# Patient Record
Sex: MALE | Race: WHITE | Employment: OTHER | URBAN - METROPOLITAN AREA
[De-identification: names, ages, dates, MRNs, and addresses within clinical notes are randomized per-mention and may not be internally consistent; named-entity substitution may affect disease eponyms.]

---

## 2019-02-15 ENCOUNTER — OFFICE VISIT (OUTPATIENT)
Dept: ORTHOPEDIC SURGERY | Age: 79
End: 2019-02-15
Payer: MEDICARE

## 2019-02-15 VITALS — HEIGHT: 72 IN | WEIGHT: 220.9 LBS | BODY MASS INDEX: 29.92 KG/M2

## 2019-02-15 DIAGNOSIS — M25.511 RIGHT SHOULDER PAIN, UNSPECIFIED CHRONICITY: Primary | ICD-10-CM

## 2019-02-15 DIAGNOSIS — S46.011A STRAIN OF RIGHT ROTATOR CUFF CAPSULE, INITIAL ENCOUNTER: ICD-10-CM

## 2019-02-15 DIAGNOSIS — M54.2 CERVICAL SPINE PAIN: ICD-10-CM

## 2019-02-15 DIAGNOSIS — M54.12 CERVICAL RADICULITIS: ICD-10-CM

## 2019-02-15 PROCEDURE — 99203 OFFICE O/P NEW LOW 30 MIN: CPT | Performed by: PHYSICIAN ASSISTANT

## 2019-02-15 RX ORDER — METHYLPREDNISOLONE 4 MG/1
TABLET ORAL
Qty: 1 KIT | Refills: 0 | Status: ON HOLD | OUTPATIENT
Start: 2019-02-15 | End: 2022-10-22

## 2019-02-15 RX ORDER — TRAMADOL HYDROCHLORIDE 50 MG/1
50 TABLET ORAL EVERY 8 HOURS PRN
Qty: 15 TABLET | Refills: 0 | Status: SHIPPED | OUTPATIENT
Start: 2019-02-15 | End: 2019-02-20

## 2019-02-20 ENCOUNTER — OFFICE VISIT (OUTPATIENT)
Dept: ORTHOPEDIC SURGERY | Age: 79
End: 2019-02-20
Payer: MEDICARE

## 2019-02-20 VITALS — WEIGHT: 220 LBS | HEIGHT: 72 IN | BODY MASS INDEX: 29.8 KG/M2

## 2019-02-20 DIAGNOSIS — S46.011A STRAIN OF RIGHT ROTATOR CUFF CAPSULE, INITIAL ENCOUNTER: ICD-10-CM

## 2019-02-20 DIAGNOSIS — M25.511 RIGHT SHOULDER PAIN, UNSPECIFIED CHRONICITY: Primary | ICD-10-CM

## 2019-02-20 PROCEDURE — 99213 OFFICE O/P EST LOW 20 MIN: CPT | Performed by: ORTHOPAEDIC SURGERY

## 2019-02-27 ENCOUNTER — OFFICE VISIT (OUTPATIENT)
Dept: ORTHOPEDIC SURGERY | Age: 79
End: 2019-02-27
Payer: MEDICARE

## 2019-02-27 VITALS — WEIGHT: 220 LBS | HEIGHT: 72 IN | BODY MASS INDEX: 29.8 KG/M2

## 2019-02-27 DIAGNOSIS — M25.511 RIGHT SHOULDER PAIN, UNSPECIFIED CHRONICITY: Primary | ICD-10-CM

## 2019-02-27 DIAGNOSIS — S40.011A CONTUSION OF RIGHT SHOULDER, INITIAL ENCOUNTER: ICD-10-CM

## 2019-02-27 PROCEDURE — 99213 OFFICE O/P EST LOW 20 MIN: CPT | Performed by: ORTHOPAEDIC SURGERY

## 2019-03-06 ENCOUNTER — HOSPITAL ENCOUNTER (OUTPATIENT)
Dept: PHYSICAL THERAPY | Age: 79
Setting detail: THERAPIES SERIES
Discharge: HOME | End: 2019-03-06
Payer: MEDICARE

## 2019-03-06 ENCOUNTER — OFFICE VISIT (OUTPATIENT)
Dept: ORTHOPEDIC SURGERY | Age: 79
End: 2019-03-06
Payer: MEDICARE

## 2019-03-06 VITALS — HEIGHT: 72 IN | WEIGHT: 220.02 LBS | BODY MASS INDEX: 29.8 KG/M2

## 2019-03-06 DIAGNOSIS — S40.011A CONTUSION OF RIGHT SHOULDER, INITIAL ENCOUNTER: ICD-10-CM

## 2019-03-06 DIAGNOSIS — M17.10 ARTHRITIS OF KNEE: Primary | ICD-10-CM

## 2019-03-06 PROCEDURE — 99213 OFFICE O/P EST LOW 20 MIN: CPT | Performed by: ORTHOPAEDIC SURGERY

## 2019-03-06 PROCEDURE — 97112 NEUROMUSCULAR REEDUCATION: CPT | Performed by: PHYSICAL THERAPIST

## 2019-03-06 PROCEDURE — 97162 PT EVAL MOD COMPLEX 30 MIN: CPT | Performed by: PHYSICAL THERAPIST

## 2019-03-06 PROCEDURE — 97110 THERAPEUTIC EXERCISES: CPT | Performed by: PHYSICAL THERAPIST

## 2019-03-06 PROCEDURE — 97140 MANUAL THERAPY 1/> REGIONS: CPT | Performed by: PHYSICAL THERAPIST

## 2019-03-06 PROCEDURE — G8984 CARRY CURRENT STATUS: HCPCS | Performed by: PHYSICAL THERAPIST

## 2019-03-06 PROCEDURE — G8985 CARRY GOAL STATUS: HCPCS | Performed by: PHYSICAL THERAPIST

## 2019-03-11 ENCOUNTER — HOSPITAL ENCOUNTER (OUTPATIENT)
Dept: PHYSICAL THERAPY | Age: 79
Setting detail: THERAPIES SERIES
Discharge: HOME | End: 2019-03-11
Payer: MEDICARE

## 2019-03-11 PROCEDURE — 97140 MANUAL THERAPY 1/> REGIONS: CPT

## 2019-03-11 PROCEDURE — 97112 NEUROMUSCULAR REEDUCATION: CPT

## 2019-03-11 PROCEDURE — G0283 ELEC STIM OTHER THAN WOUND: HCPCS

## 2019-03-11 PROCEDURE — 97110 THERAPEUTIC EXERCISES: CPT

## 2019-03-13 ENCOUNTER — HOSPITAL ENCOUNTER (OUTPATIENT)
Dept: PHYSICAL THERAPY | Age: 79
Setting detail: THERAPIES SERIES
Discharge: HOME | End: 2019-03-13
Payer: MEDICARE

## 2019-03-13 PROCEDURE — G0283 ELEC STIM OTHER THAN WOUND: HCPCS

## 2019-03-13 PROCEDURE — 97110 THERAPEUTIC EXERCISES: CPT

## 2019-03-13 PROCEDURE — 97140 MANUAL THERAPY 1/> REGIONS: CPT

## 2019-03-18 ENCOUNTER — HOSPITAL ENCOUNTER (OUTPATIENT)
Dept: PHYSICAL THERAPY | Age: 79
Setting detail: THERAPIES SERIES
Discharge: HOME | End: 2019-03-18
Payer: MEDICARE

## 2019-03-18 PROCEDURE — 97140 MANUAL THERAPY 1/> REGIONS: CPT | Performed by: PHYSICAL THERAPIST

## 2019-03-18 PROCEDURE — G0283 ELEC STIM OTHER THAN WOUND: HCPCS | Performed by: PHYSICAL THERAPIST

## 2019-03-18 PROCEDURE — 97110 THERAPEUTIC EXERCISES: CPT | Performed by: PHYSICAL THERAPIST

## 2019-03-20 ENCOUNTER — HOSPITAL ENCOUNTER (OUTPATIENT)
Dept: PHYSICAL THERAPY | Age: 79
Setting detail: THERAPIES SERIES
Discharge: HOME | End: 2019-03-20
Payer: MEDICARE

## 2019-03-20 PROCEDURE — 97110 THERAPEUTIC EXERCISES: CPT

## 2019-03-20 PROCEDURE — 97112 NEUROMUSCULAR REEDUCATION: CPT

## 2019-03-25 ENCOUNTER — HOSPITAL ENCOUNTER (OUTPATIENT)
Dept: PHYSICAL THERAPY | Age: 79
Setting detail: THERAPIES SERIES
Discharge: HOME | End: 2019-03-25
Payer: MEDICARE

## 2019-03-25 PROCEDURE — 97112 NEUROMUSCULAR REEDUCATION: CPT

## 2019-03-27 ENCOUNTER — OFFICE VISIT (OUTPATIENT)
Dept: ORTHOPEDIC SURGERY | Age: 79
End: 2019-03-27
Payer: MEDICARE

## 2019-03-27 ENCOUNTER — HOSPITAL ENCOUNTER (OUTPATIENT)
Dept: PHYSICAL THERAPY | Age: 79
Setting detail: THERAPIES SERIES
Discharge: HOME | End: 2019-03-27
Payer: MEDICARE

## 2019-03-27 ENCOUNTER — APPOINTMENT (OUTPATIENT)
Dept: PHYSICAL THERAPY | Age: 79
End: 2019-03-27
Payer: MEDICARE

## 2019-03-27 VITALS — HEIGHT: 72 IN | BODY MASS INDEX: 29.8 KG/M2 | WEIGHT: 220.02 LBS

## 2019-03-27 DIAGNOSIS — S40.011A CONTUSION OF RIGHT SHOULDER, INITIAL ENCOUNTER: Primary | ICD-10-CM

## 2019-03-27 DIAGNOSIS — M17.10 ARTHRITIS OF KNEE: ICD-10-CM

## 2019-03-27 PROCEDURE — 97140 MANUAL THERAPY 1/> REGIONS: CPT | Performed by: PHYSICAL THERAPIST

## 2019-03-27 PROCEDURE — 97110 THERAPEUTIC EXERCISES: CPT | Performed by: PHYSICAL THERAPIST

## 2019-03-27 PROCEDURE — 99213 OFFICE O/P EST LOW 20 MIN: CPT | Performed by: ORTHOPAEDIC SURGERY

## 2019-03-27 PROCEDURE — 97112 NEUROMUSCULAR REEDUCATION: CPT | Performed by: PHYSICAL THERAPIST

## 2019-04-03 ENCOUNTER — HOSPITAL ENCOUNTER (OUTPATIENT)
Dept: PHYSICAL THERAPY | Age: 79
Setting detail: THERAPIES SERIES
Discharge: HOME | End: 2019-04-03
Payer: MEDICARE

## 2019-04-03 PROCEDURE — 97112 NEUROMUSCULAR REEDUCATION: CPT

## 2019-04-03 PROCEDURE — 97140 MANUAL THERAPY 1/> REGIONS: CPT

## 2019-04-03 PROCEDURE — 97110 THERAPEUTIC EXERCISES: CPT

## 2019-04-03 NOTE — FLOWSHEET NOTE
[facility],  [address] [place]  Phone: [phone]  Fax [phone]      Physical Therapy Daily Treatment Note  Date:  4/3/2019    Patient Name:  Sharron Pineda  \"Ranulfo\"  :  1940  MRN: [id number]  Restrictions/Precautions:    Medical/Treatment Diagnosis Information:  · Diagnosis: M25.511 (ICD-10-CM) - Right shoulder pain, unspecified chronicity, S40.011A (ICD-10-CM) - Contusion of right shoulder, initial encounter  · Treatment Diagnosis: M25.511 (ICD-10-CM) - Right shoulder pain, unspecified chronicity  Insurance/Certification information:  PT Insurance Information:  Ellie Allred   - 150D-96/4-$0CP-PT/OT MED NEC  Physician Information:  Referring Practitioner: Verito Walton of care signed (Y/N):     Date of Patient follow up with Physician: 3/27/19    G-Code (if applicable):      Date G-Code Applied:  3/6  PT G-Codes  Functional Assessment Tool Used: QDASH  Score: 61%  Functional Limitation: Carrying, moving and handling objects  Carrying, Moving and Handling Objects Current Status (): At least 60 percent but less than 80 percent impaired, limited or restricted  Carrying, Moving and Handling Objects Goal Status (): At least 20 percent but less than 40 percent impaired, limited or restricted    Progress Note: []  Yes  [x]  No  Next due by: Visit #10      Latex Allergy:  [x]NO      []YES  Preferred Language for Healthcare:   [x]English       []other:    Visit # Insurance Allowable Requires auth   8 BMN    [x]no        []yes:     Pain level:  0-6/10     SUBJECTIVE: Patient reports that his shoulder hurt him all morning. He reports that his wife continues to put the TENS Unit and Ice on his shoulder.      OBJECTIVE: Elevated RW to influence a more upright posture    Observation: Sit to stand with less difficulty and no report of pain    R UT compensation during scap squeezes, Poor postural awareness, very forward head  Test measurements:      RESTRICTIONS/PRECAUTIONS:  Dementia,DM, neuropathy, R TKA, hx of CA, HTN, FALL RISK      Exercises/Interventions: Demo HEP with some corrections for technique  Therapeutic Ex Sets/rep comments    seated chin tucks  Difficult initiating   ER/IR isos  HEP   Scap squeezes  HEP   Long arc quad  Marching  HEP  HEP   AAROM Abd ring FF and Rows 2 x 10 eachseated   Cane ER S    Candace line posture exercise    Active elevation reach behind R ear    Active shld flexion w short lever arm    AAROM knee flexion S in sitting     TB row seated     Standing arm flexion S on wedge Posterior lean   R UE reaching forward, diagonals to cones standing Cues for knee ext   No money seated RTB 1x10    SL ER  SL flexion     Supine punches  Supine D1 flexion Pt ed/wife: POC, HEP, use of walker, posture, safe transfer technique, heat v. Ice, handrails, carrying a phone/getting basket for walker, type of walker that will be best                    Manual Intervention     PROM shoulder in sitting ER and FF, gentle oscillation, gentle STM to UT and Bicep, STM to cervical paraspinals 15'                                  NMR re-education      Gait training, walker heightadjustment posture with walker 8 min Cues for posture/w rest periods   Double stance, Modified Tandem R/L rearfoot All x 5 reps each w/ rest periods Gait belt and Air-Ex   B Hip flexion on Air -Ex/Abd Stair training B handrails 1 stair at a time   Sit to stand mod assist and 2 hand assist 10x    Double Stance on Air-Ex/scap squeeze 2 x 7 CGA   Balance w gait Tandem Amb x 3 laps and HF w/ Heel strike x 3 laps, Backward amb 1/2 lap 8 min W/ Gait belt CGA, posterior hips, difficulty maneuvering walker backwards   Sit to stand B abd OVL/scap squeeze 1x 7 reps CGA, no air-ex       Therapeutic Exercise and NMR EXR  [x] (99787) Provided verbal/tactile cueing for activities related to strengthening, flexibility, endurance, ROM  for improvements in scapular, scapulothoracic and UE control with self care, reaching, carrying, lifting, house/yardwork, driving/computer work. [x] (84470) Provided verbal/tactile cueing for activities related to improving balance, coordination, kinesthetic sense, posture, motor skill, proprioception  to assist with  scapular, scapulothoracic and UE control with self care, reaching, carrying, lifting, house/yardwork, driving/computer work. Therapeutic Activities:    [] (88503 or 88089) Provided verbal/tactile cueing for activities related to improving balance, coordination, kinesthetic sense, posture, motor skill, proprioception and motor activation to allow for proper function of scapular, scapulothoracic and UE control with self care, carrying, lifting, driving/computer work.      Home Exercise Program:    [x] (94179) Reviewed/Progressed HEP activities related to strengthening, flexibility, endurance, ROM of scapular, scapulothoracic and UE control with self care, reaching, carrying, lifting, house/yardwork, driving/computer work  [] (13121) Reviewed/Progressed HEP activities related to improving balance, coordination, kinesthetic sense, posture, motor skill, proprioception of scapular, scapulothoracic and UE control with self care, reaching, carrying, lifting, house/yardwork, driving/computer work      Manual Treatments:  PROM / STM / Oscillations-Mobs:  G-I, II, III, IV (PA's, Inf., Post.)  [x] (53429) Provided manual therapy to mobilize soft tissue/joints of cervical/CT, scapular GHJ and UE for the purpose of modulating pain, promoting relaxation,  increasing ROM, reducing/eliminating soft tissue swelling/inflammation/restriction, improving soft tissue extensibility and allowing for proper ROM for normal function with self care, reaching, carrying, lifting, house/yardwork, driving/computer work    Modalities:   Charges:  Timed Code Treatment Minutes: 55   Total Treatment Minutes: 55     [] NEIL (LOW) 159 1011 (typically 20 minutes face-to-face)  [] EVAL (MOD) 86500 (typically 30 minutes face-to-face)  [] EVAL (HIGH) 43516 (typically 45 minutes face-to-face)  [] RE-EVAL     [x] VL(07905) x  1   [] IONTO  [x] NMR (77222) x  2   [] VASO  [x] Manual (71478) x  1    [] Other:  [] TA x       [] Mech Traction (48602)  [] ES(attended) (59255)      [] ES (un) (39320):     GOALS:  Patient stated goal: decrease pain in shoulder     Therapist goals for Patient:   Short Term Goals: To be achieved in: 2 weeks  1. Independent in HEP and progression per patient tolerance, in order to prevent re-injury. 2. Patient will have a decrease in pain to facilitate improvement in movement, function, and ADLs as indicated by Functional Deficits.     Long Term Goals: To be achieved in: 8 weeks  1. Disability index score of 30% or less for the [facility] to assist with reaching prior level of function. 2. Patient will demonstrate increased AROM to = to L side to allow for proper joint functioning as indicated by patients Functional Deficits. 3. Patient will demonstrate an increase in Strength to grossly 4/5 in shoulder and scapula; 5/5 in LEs to allow for proper functional mobility as indicated by patients Functional Deficits. 4. Patient will demonstrate good mechanics and safety awareness with gait, sit to stand, stand to sit, and stairs  without increased symptoms or restriction. 5. Patient will be able to tolerate WB through R UE on RW for 300' or greater without increased symptoms in the shoulder. Progression Towards Functional goals:  [] Patient is progressing as expected towards functional goals listed. [x] Progression is slowed due to complexities listed. [] Progression has been slowed due to co-morbidities. [] Plan just implemented, too soon to assess goals progression  [x] Other: Shoulder and knee pain    ASSESSMENT: Pt requires continuous verbal and tactile cues for upright posture.  Patient did report less shoulder pain after raising the height of his walker. Treatment/Activity Tolerance:  [] Patient tolerated treatment well [x] Patient limited by fatique  [x] Patient limited by pain  [] Patient limited by other medical complications  [] Other:     Prognosis: [] Good [x] Fair  [] Poor    Patient Requires Follow-up: [x] Yes  [] No    PLAN: Cont treatment balance training and safety with amb and transfers, shoulder functional ROM and strength.   [x] Continue per plan of care [] Alter current plan (see comments)  [] Plan of care initiated [] Hold pending MD visit [] Discharge    Electronically signed by: Maryann Guevara PT

## 2019-04-10 ENCOUNTER — HOSPITAL ENCOUNTER (OUTPATIENT)
Dept: PHYSICAL THERAPY | Age: 79
Setting detail: THERAPIES SERIES
Discharge: HOME | End: 2019-04-10
Payer: MEDICARE

## 2019-04-10 PROCEDURE — 97140 MANUAL THERAPY 1/> REGIONS: CPT

## 2019-04-10 PROCEDURE — 97112 NEUROMUSCULAR REEDUCATION: CPT

## 2019-04-10 PROCEDURE — 97110 THERAPEUTIC EXERCISES: CPT

## 2019-04-10 NOTE — FLOWSHEET NOTE
[facility],  [address]  Phone: [phone]  Fax [phone]      Physical Therapy Daily Treatment Note  Date:  4/10/2019    Patient Name:  Hank Ivy  \"Ranulfo\"  :  1940  MRN: [id number]  Restrictions/Precautions:    Medical/Treatment Diagnosis Information:  · Diagnosis: M25.511 (ICD-10-CM) - Right shoulder pain, unspecified chronicity, S40.011A (ICD-10-CM) - Contusion of right shoulder, initial encounter  · Treatment Diagnosis: M25.511 (ICD-10-CM) - Right shoulder pain, unspecified chronicity  Insurance/Certification information:  PT Insurance Information:  [id number] [employer]  - 150D-96/4-$0CP-PT/OT MED NEC  Physician Information:  Referring Practitioner: Ricki Guo of care signed (Y/N):     Date of Patient follow up with Physician: 3/27/19    G-Code (if applicable):      Date G-Code Applied:  3/6  PT G-Codes  Functional Assessment Tool Used: QDASH  Score: 61%  Functional Limitation: Carrying, moving and handling objects  Carrying, Moving and Handling Objects Current Status (): At least 60 percent but less than 80 percent impaired, limited or restricted  Carrying, Moving and Handling Objects Goal Status (): At least 20 percent but less than 40 percent impaired, limited or restricted    Progress Note: []  Yes  [x]  No  Next due by: Visit #10      Latex Allergy:  [x]NO      []YES  Preferred Language for Healthcare:   [x]English       []other:    Visit # Insurance Allowable Requires auth   9 BMN    [x]no        []yes:     Pain level:  0-6/10     SUBJECTIVE: Patient reports that his shoulder has been killing him. More pain the past couple of days than ever before. He reports that he wants nothing to do with his walker.   OBJECTIVE: Elevated RW to influence a more upright posture    Observation: Sit to stand with less difficulty and no report of pain    R UT compensation during scap squeezes, Poor postural awareness, very forward head  Test measurements:      RESTRICTIONS/PRECAUTIONS:  Dementia,DM, neuropathy, R TKA, hx of CA, HTN, FALL RISK      Exercises/Interventions: Demo HEP with some corrections for technique  Therapeutic Ex Sets/rep comments    seated chin tucks  Difficult initiating   ER/IR isos  HEP   Scap squeezes  HEP   Long arc quad  Marching  HEP  HEP   AAROM Abd ring FF and Rows 2 x 10 eachseated   Cane ER S    Candace line posture exercise    Active elevation reach behind R ear    Active shld flexion w short lever arm    AAROM knee flexion S in sitting     TB row seated     Standing arm flexion S on wedge Posterior lean   R UE reaching forward, diagonals to cones standing Cues for knee ext   No money seated RTB 1x10    SL ER  SL flexion     Supine punches  Supine D1 flexion Pt ed/wife: POC, HEP, use of walker, posture, safe transfer technique, heat v. Ice, handrails, carrying a phone/getting basket for walker, type of walker that will be best                    Manual Intervention     PROM shoulder in sitting ER and FF, gentle oscillation, gentle STM to UT and Bicep, STM to cervical paraspinals 15'                                  NMR re-education      Gait training, walker heightadjustment posture with walker 8 min Cues for posture/w rest periods   Double stance, Modified Tandem R/L rearfoot All x 5 reps each w/ rest periods Gait belt and Air-Ex   B Hip flexion on Air -Ex/Abd Stair training B handrails 1 stair at a time   Sit to stand mod assist and 2 hand assist 10x    Double Stance on Air-Ex/scap squeeze 2 x 7 CGA   Balance w gait Tandem Amb x 3 laps and HF w/ Heel strike x 3 laps, Backward amb 1/2 lap 8 min W/ Gait belt CGA, posterior hips, difficulty maneuvering walker backwards   Sit to stand B abd OVL/scap squeeze 1x 7 reps CGA, no air-ex       Therapeutic Exercise and NMR EXR  [x] (39725) Provided verbal/tactile cueing for activities related to strengthening, flexibility, endurance, ROM 04322 (typically 20 minutes face-to-face)  [] EVAL (MOD) 62600 (typically 30 minutes face-to-face)  [] EVAL (HIGH) 24197 (typically 45 minutes face-to-face)  [] RE-EVAL     [x] VF(74405) x  1   [] IONTO  [x] NMR (71191) x  2   [] VASO  [x] Manual (13934) x  1    [] Other:  [] TA x       [] Mech Traction (21789)  [] ES(attended) (20524)      [] ES (un) (85639):     GOALS:  Patient stated goal: decrease pain in shoulder     Therapist goals for Patient:   Short Term Goals: To be achieved in: 2 weeks  1. Independent in HEP and progression per patient tolerance, in order to prevent re-injury. 2. Patient will have a decrease in pain to facilitate improvement in movement, function, and ADLs as indicated by Functional Deficits.     Long Term Goals: To be achieved in: 8 weeks  1. Disability index score of 30% or less for the [facility] to assist with reaching prior level of function. 2. Patient will demonstrate increased AROM to = to L side to allow for proper joint functioning as indicated by patients Functional Deficits. 3. Patient will demonstrate an increase in Strength to grossly 4/5 in shoulder and scapula; 5/5 in LEs to allow for proper functional mobility as indicated by patients Functional Deficits. 4. Patient will demonstrate good mechanics and safety awareness with gait, sit to stand, stand to sit, and stairs  without increased symptoms or restriction. 5. Patient will be able to tolerate WB through R UE on RW for 300' or greater without increased symptoms in the shoulder. Progression Towards Functional goals:  [] Patient is progressing as expected towards functional goals listed. [x] Progression is slowed due to complexities listed. [] Progression has been slowed due to co-morbidities. [] Plan just implemented, too soon to assess goals progression  [x] Other: Shoulder and knee pain    ASSESSMENT: Max cues for upright posture throughout treatment.  Patient expresses frustration with his amount of pain and the length of time that he has had the pain in his shoulder. Treatment/Activity Tolerance:  [] Patient tolerated treatment well [x] Patient limited by fatique  [x] Patient limited by pain  [] Patient limited by other medical complications  [] Other:     Prognosis: [] Good [x] Fair  [] Poor    Patient Requires Follow-up: [x] Yes  [] No    PLAN: Cont treatment balance training and safety with amb and transfers, shoulder functional ROM and strength.   [x] Continue per plan of care [] Alter current plan (see comments)  [] Plan of care initiated [] Hold pending MD visit [] Discharge    Electronically signed by: Amador Sanabria PT

## 2019-04-17 ENCOUNTER — HOSPITAL ENCOUNTER (OUTPATIENT)
Dept: PHYSICAL THERAPY | Age: 79
Setting detail: THERAPIES SERIES
Discharge: HOME | End: 2019-04-17
Payer: MEDICARE

## 2019-04-17 PROCEDURE — G8985 CARRY GOAL STATUS: HCPCS | Performed by: PHYSICAL THERAPIST

## 2019-04-17 PROCEDURE — 97110 THERAPEUTIC EXERCISES: CPT | Performed by: PHYSICAL THERAPIST

## 2019-04-17 PROCEDURE — 97140 MANUAL THERAPY 1/> REGIONS: CPT | Performed by: PHYSICAL THERAPIST

## 2019-04-17 PROCEDURE — 97112 NEUROMUSCULAR REEDUCATION: CPT | Performed by: PHYSICAL THERAPIST

## 2019-04-17 PROCEDURE — G8984 CARRY CURRENT STATUS: HCPCS | Performed by: PHYSICAL THERAPIST

## 2019-04-17 NOTE — PLAN OF CARE
[facility], [address], [address] [place] [address]  Phone: [phone]  Fax [phone]     Physical Therapy Re-Certification Plan of Care    Dear  ,    We had the pleasure of treating the following patient for physical therapy services at [address] [place] [address]. A summary of our findings can be found in the updated assessment below. This includes our plan of care. If you have any questions or concerns regarding these findings, please do not hesitate to contact me at the office phone number checked above. Thank you for the referral.     Physician Signature:________________________________Date:__________________  By signing above, therapists plan is approved by physician      Patient: Subhash Pop   : 1940   MRN: [id number]  Referring Physician:        Evaluation Date: 2019      Medical/Treatment Diagnosis Information:  · Diagnosis: M25.511 (ICD-10-CM) - Right shoulder pain, unspecified chronicity, S40.011A (ICD-10-CM) - Contusion of right shoulder, initial encounter  · Treatment Diagnosis: M25.511 (ICD-10-CM) - Right shoulder pain, unspecified chronicity  Insurance/Certification information:  PT Insurance Information:  [id number] [place]  - 150D-96/4-$0CP-PT/OT MED NEC    Date Range:3/6/19-19  Total visits:10      G-Codes: (if applicable) PT G-Codes  Functional Assessment Tool Used: Qdash  Score: 61%  Functional Limitation: Carrying, moving and handling objects  Carrying, Moving and Handling Objects Current Status (): At least 60 percent but less than 80 percent impaired, limited or restricted  Carrying, Moving and Handling Objects Goal Status ():  At least 20 percent but less than 40 percent impaired, limited or restricted   Functional Index used:    SUBJECTIVE: Patient reports he feels that his shoulder pain has been getting worse over the last week and he is also experiencing discomfort in level   [x]pain/difficulty with lifting/reaching/carrying - Reduced overall functional level  with carrying and lifting   []unable to perform sport/recreational activity due to pain and dysfunction   [x]other:  gym     Prognosis/Rehab Potential:    []Excellent   []Good    [x]Fair   []Poor: Toleration of evaluation or treatment:    []Excellent   []Good    [x]Fair   []Poor     New or Updated Goals (if applicable):  [] No change to goals established upon initial eval/last progress note:  New Goals:    GOALS: Long Term Goals: To be achieved by:  5/31/19  1. Disability index score of 30% or less for the [facility] to assist with reaching prior level of function. --no progress  2. Patient will demonstrate increased AROM to = to L side to allow for proper joint functioning as indicated by patients Functional Deficits. --progressing  3. Patient will demonstrate an increase in Strength to grossly 4/5 in shoulder and scapula; 5/5 in LEs to allow for proper functional mobility as indicated by patients Functional Deficits. Slight progress  4. Patient will demonstrate good mechanics and safety awareness with gait, sit to stand, stand to sit, and stairs  without increased symptoms or restriction. --progressing  5. Patient will be able to tolerate WB through R UE on RW for 300' or greater without increased symptoms in the shoulder. --progressing  6. Pt will complete TUG test in </= 19\" with AD to demonstrate decreased fall risk. Rehab Potential:   []Excellent   [] Good   [x] Fair   [] Poor    Plan of Care:  [x] Continue Current Therapy Intervention    Frequency/Duration:  1-2 days per week for 6 Weeks:  HEP instruction: yes  1. Therapeutic exercise including: strength training, ROM, NMR and proprioception for the scapula, core and Upper extremity  2. Manual therapy as indicated including Dry Needling/IASTM, STM, PROM, Gr I-IV mobilizations, spinal mobilization/manipulation.    3. Modalities as needed including: thermal agents, E-stim, US, iontophoresis as indicated. 4. Patient education on joint protection, activity modification, progression of HEP.        Electronically signed by:  Magalis Dumont PT

## 2019-04-17 NOTE — FLOWSHEET NOTE
[facility],  [address]  Phone: [phone]  Fax [phone]      Physical Therapy Daily Treatment Note  Date:  2019    Patient Name:  Bandar Lamb  \"Ranulfo\"  :  1940  MRN: [id number]  Restrictions/Precautions:    Medical/Treatment Diagnosis Information:  · Diagnosis: M25.511 (ICD-10-CM) - Right shoulder pain, unspecified chronicity, S40.011A (ICD-10-CM) - Contusion of right shoulder, initial encounter  · Treatment Diagnosis: M25.511 (ICD-10-CM) - Right shoulder pain, unspecified chronicity  Insurance/Certification information:  PT Insurance Information:  [id number] [employer]. [place]  - 150D-96/4-$0CP-PT/OT MED NEC  Physician Information:  Referring Practitioner: Shruti Partida of care signed (Y/N):     Date of Patient follow up with Physician: 3/27/19    G-Code (if applicable):   10%    Date G-Code Applied:  3/6  PT G-Codes  Functional Assessment Tool Used: QDASH  Score: 61%  Functional Limitation: Carrying, moving and handling objects  Carrying, Moving and Handling Objects Current Status (): At least 60 percent but less than 80 percent impaired, limited or restricted  Carrying, Moving and Handling Objects Goal Status (): At least 20 percent but less than 40 percent impaired, limited or restricted    Progress Note: []  Yes  [x]  No  Next due by: Visit #20 (19)     Latex Allergy:  [x]NO      []YES  Preferred Language for Healthcare:   [x]English       []other:    Visit # Insurance Allowable Requires auth   10 BMN    [x]no        []yes:     Pain level:  0-6/10     SUBJECTIVE: Patient reports he feels that his shoulder pain has been getting worse over the last week and he is also experiencing discomfort in his neck now. He states he uses the walker outside the home, but really hates it and admits to not always using it around the house when his family is not around.       OBJECTIVE: POC Observation:   R UT compensation during scap squeezes, Poor postural awareness, very forward head  Test measurements:  PROM flexion 140, Abd 74, IR 65, ER 40; AROM scap 125, ER C5, IR gr trochanter; MMT flex 3+, IR 4-, ER 3+; TUG test 23\" w/ walker    RESTRICTIONS/PRECAUTIONS:  Dementia,DM, neuropathy, R TKA, hx of CA, HTN, FALL RISK      Exercises/Interventions:   Therapeutic Ex Sets/rep comments    seated chin tucks  Difficult initiating   ER/IR isos  HEP   Scap squeezes  HEP   Long arc quad  Marching  HEP  HEP   AAROM Abd ring FF and Rows seated   Cane ER S    Candace line posture exercise    Active elevation reach behind R ear    Active shld flexion w short lever arm    AAROM knee flexion S in sitting     TB row seated     Standing arm flexion S on wedge Posterior lean   R UE reaching forward, diagonals to cones standing Cues for knee ext   No money seated  TB IR  TB row YTB 1x10  YTB 2x10  YTB 2x10    SL ER  SL flexion     Supine punches  Supine D1 flexion Finisher table B flexion slide 2# x2--pain  0# 2x8         Pt ed/wife: POC, HEP, use of walker, posture, st, shoulder anatomy 10'                   Manual Intervention     PROM shoulder in supine ER, abd, IR and FF, gentle oscillation, gentle STM to UT and Bicep, STM to cervical paraspinals 20'                                  NMR re-education      Gait training, walker heightadjustment posture with walker 1 lap clinic w/ 1 rest break Cues for posture   Double stance, Modified Tandem R/L rearfoot  Gait belt and Air-Ex   B Hip flexion on Air -Ex/Abd Stair training B handrails 1 stair at a time   Sit to stand mod assist and 2 hand assist     Double Stance on Air-Ex/scap squeeze  CGA   Balance w gait Tandem Amb x 3 laps and HF w/ Heel strike x 3 laps, Backward amb 1/2 lap  W/ Gait belt CGA, posterior hips, difficulty maneuvering walker backwards   Sit to stand B abd OVL/scap squeeze  CGA, no air-ex       Therapeutic Exercise and NMR EXR  [x] (82111) Provided verbal/tactile cueing for activities related to strengthening, flexibility, endurance, ROM  for improvements in scapular, scapulothoracic and UE control with self care, reaching, carrying, lifting, house/yardwork, driving/computer work. [x] (67611) Provided verbal/tactile cueing for activities related to improving balance, coordination, kinesthetic sense, posture, motor skill, proprioception  to assist with  scapular, scapulothoracic and UE control with self care, reaching, carrying, lifting, house/yardwork, driving/computer work. Therapeutic Activities:    [] (03052 or 47768) Provided verbal/tactile cueing for activities related to improving balance, coordination, kinesthetic sense, posture, motor skill, proprioception and motor activation to allow for proper function of scapular, scapulothoracic and UE control with self care, carrying, lifting, driving/computer work.      Home Exercise Program:    [x] (83958) Reviewed/Progressed HEP activities related to strengthening, flexibility, endurance, ROM of scapular, scapulothoracic and UE control with self care, reaching, carrying, lifting, house/yardwork, driving/computer work  [] (00314) Reviewed/Progressed HEP activities related to improving balance, coordination, kinesthetic sense, posture, motor skill, proprioception of scapular, scapulothoracic and UE control with self care, reaching, carrying, lifting, house/yardwork, driving/computer work      Manual Treatments:  PROM / STM / Oscillations-Mobs:  G-I, II, III, IV (PA's, Inf., Post.)  [x] (26473) Provided manual therapy to mobilize soft tissue/joints of cervical/CT, scapular GHJ and UE for the purpose of modulating pain, promoting relaxation,  increasing ROM, reducing/eliminating soft tissue swelling/inflammation/restriction, improving soft tissue extensibility and allowing for proper ROM for normal function with self care, reaching, carrying, lifting, house/yardwork, driving/computer work    Modalities: /CP x 10 min R SHld  Pt has home TENS unit  Charges:  Timed Code Treatment Minutes: 55   Total Treatment Minutes: 65     [] EVAL (LOW) 32960 (typically 20 minutes face-to-face)  [] EVAL (MOD) 17423 (typically 30 minutes face-to-face)  [] EVAL (HIGH) 53418 (typically 45 minutes face-to-face)  [] RE-EVAL     [x] IN(31148) x  2   [] IONTO  [x] NMR (84413) x  1   [] VASO  [x] Manual (70864) x  1    [] Other:  [] TA x       [] Mech Traction (42251)  [] ES(attended) (98570)      [] ES (un) (36945):     GOALS:  Patient stated goal: decrease pain in shoulder     Therapist goals for Patient:   Short Term Goals: To be achieved in: 2 weeks  1. Independent in HEP and progression per patient tolerance, in order to prevent re-injury. 2. Patient will have a decrease in pain to facilitate improvement in movement, function, and ADLs as indicated by Functional Deficits.     Long Term Goals: To be achieved by:  5/31/19  1. Disability index score of 30% or less for the [facility] to assist with reaching prior level of function. --no progress  2. Patient will demonstrate increased AROM to = to L side to allow for proper joint functioning as indicated by patients Functional Deficits. --progressing  3. Patient will demonstrate an increase in Strength to grossly 4/5 in shoulder and scapula; 5/5 in LEs to allow for proper functional mobility as indicated by patients Functional Deficits. Slight progress  4. Patient will demonstrate good mechanics and safety awareness with gait, sit to stand, stand to sit, and stairs  without increased symptoms or restriction. --progressing  5. Patient will be able to tolerate WB through R UE on RW for 300' or greater without increased symptoms in the shoulder. --progressing  6. Pt will complete TUG test in </= 19\" with AD to demonstrate decreased fall risk. Progression Towards Functional goals:  [] Patient is progressing as expected towards functional goals listed.     [x] Progression is slowed due to

## 2019-04-24 ENCOUNTER — HOSPITAL ENCOUNTER (OUTPATIENT)
Dept: PHYSICAL THERAPY | Age: 79
Setting detail: THERAPIES SERIES
Discharge: HOME | End: 2019-04-24
Payer: MEDICARE

## 2019-04-24 ENCOUNTER — OFFICE VISIT (OUTPATIENT)
Dept: ORTHOPEDIC SURGERY | Age: 79
End: 2019-04-24
Payer: MEDICARE

## 2019-04-24 VITALS — WEIGHT: 220 LBS | HEIGHT: 72 IN | BODY MASS INDEX: 29.8 KG/M2

## 2019-04-24 DIAGNOSIS — M25.511 RIGHT SHOULDER PAIN, UNSPECIFIED CHRONICITY: Primary | ICD-10-CM

## 2019-04-24 PROCEDURE — 97112 NEUROMUSCULAR REEDUCATION: CPT | Performed by: PHYSICAL THERAPIST

## 2019-04-24 PROCEDURE — 97110 THERAPEUTIC EXERCISES: CPT | Performed by: PHYSICAL THERAPIST

## 2019-04-24 PROCEDURE — 99213 OFFICE O/P EST LOW 20 MIN: CPT | Performed by: ORTHOPAEDIC SURGERY

## 2019-04-24 NOTE — PROGRESS NOTES
MD Enma Ambrocio, [place]         Orthopaedic Surgery and Sports Medicine      Patient Name: Brisa Cerna  YOB: 1940  Date of Encounter: 4/24/2019  Patient's PCP is Taurus Coello MD    SUBJECTIVE  Chief Complaint:  Shoulder Pain (RIGHT)      History of Present Illness:  Brisa Cerna is an 70-year-old gentleman who is well-known to us. He is a previously very active individual.  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 He recently has had some problems with his gait and his balance including several falls. He has a long history of low back problems. He is currently using a walker for ambulation assistance, which we recommended at his last appointment. He is following the 2 times relatively recently. He fell on February 9, 2019 and again on February 24, 2019. On February 24 he fell forward and landed on his face and sustained facial trauma with ecchymosis around both eyes and also had some mild injuries to his right shoulder and his left knee. He is been attending outpatient physical therapy which has helped a great deal.  His wife reports that she has seen some improvement in the strength of his lower extremities. Therapist has been working with his shoulder a bit, as well. However he reports the PT has recently aggravated it and he is having more pain now than he has had since his falls. He describes the symptoms as aching, burning and sharp. He rates pain at 6/10. Symptoms improve with nothing. The symptoms are worse with everything. The shoulder has not dislocated/subluxed and/or felt unstable. There is numbness or tingling in hand/fingers (small and ring fingers). Sleeping habits related to chief complaint: good      The patient has had PT. The patient has not had an injection.     The Review of Systems:  Audrey Lyman's review of systems has been performed by intake and observation. All past and current ROS forms have been scanned into the medical record. She has been instructed to contact her primary care provider regarding ROS issues if not already being addressed at this time. There are no recent changes. The most recent ROS was scanned into media on 02/20/2019       OBJECTIVE  PHYSICAL EXAM  Vital Signs: There were no vitals filed for this visit. Body mass index is 29.84 kg/m². General Exam:   Constitutional: Patient is adequately groomed with no evidence of malnutrition  Mental Status: The patient is oriented to time, place and person. The patient's mood and affect are appropriate. Neurological: The patient has good coordination. There is no weakness or sensory deficit. Right Shoulder Examination  Inspection:    Visual deformity noted: No     no swelling noted. No erythema or ecchymosis. Skin is intact with no cellulitis, rashes, ulcerations, lymphedema     or cutaneous lesions noted. Palpation:  He has much less tenderness to palpation today and there is no areas of significant local tenderness. Range of Motion:  His shoulder range of motion has improved he can now elevate it overhead to about 130°. Special Tests:  Unable to fully test today pain    Strength: Forward flexion: 4/5        Abduction: 4/5        Internal Rotation: 5/5        External Rotation: 5/5    Neurologic & Vascular: Sensation to intact to light touch throughout median, ulnar and radial nerve distribution. The bilateral upper extremities are warm and well-perfused with brisk capillary refill. Additional Examinations:  Right Lower Extremity: Examination of the right lower extremity does not show any tenderness, deformity or injury. Small effusion noted. Evidence of previous total knee arthroplasty is in place. Incision is clean, dry and intact.   Range of motion is within normal limits. Some pain with full flexion. There is no gross instability. There are no rashes, ulcerations or lesions. Strength and tone are normal.  Left Upper Extremity: Examination of the left upper extremity does not show any tenderness, deformity or injury. Range of motion is within normal limits. There is no gross instability. There are no rashes, ulcerations or lesions. Strength and tone are normal.  Neck: Examination of the neck does not show any tenderness, deformity or injury. Range of motion is within normal limits. There is no gross instability. There are no rashes, ulcerations or lesions. Strength and tone are normal.        ASSESSMENT (Medical Decision Making)    Brisa Cerna is a 78 y.o. male with the following diagnosis: Right shoulder pain with possible rotator cuff tear      ICD-10-CM    1. Right shoulder pain, unspecified chronicity M25.511          PLAN (Medical Decision Making)  Office Procedures:  No orders of the defined types were placed in this encounter. Treatment Plan: At this time, the patient shoulder pain has been getting worse. I would like to refer him for an MRI of his right shoulder to further evaluate his rotator cuff. He has previously had an EMG done April 2018 at an outside facility and we are going to try to get those results to evaluate this as well. He will follow-up following his MRI and we can discuss further treatment options at that time    Brisa Cerna was instructed to call the office if his symptoms worsen or if new symptoms appear prior to the next scheduled visit. He is specifically instructed to contact the office between now and schedule appointment if he has concerns related to his condition or if he needs assistance in scheduling any above tests. He is welcome to call for an appointment sooner if he has any additional concerns or questions.     Enma Richey PA-C, scribing for and in the presence of Dr. Yee Hong

## 2019-04-24 NOTE — FLOWSHEET NOTE
[facility],  [address]  Phone: [phone]  Fax [phone]      Physical Therapy Daily Treatment Note  Date:  2019    Patient Name:  Sushant Bolaños  \"Ranulfo\"  :  1940  MRN: [id number]  Restrictions/Precautions:    Medical/Treatment Diagnosis Information:  · Diagnosis: M25.511 (ICD-10-CM) - Right shoulder pain, unspecified chronicity, S40.011A (ICD-10-CM) - Contusion of right shoulder, initial encounter  · Treatment Diagnosis: M25.511 (ICD-10-CM) - Right shoulder pain, unspecified chronicity  Insurance/Certification information:  PT Insurance Information:  [id number] [employer]. MC  - 150D-96/4-$0CP-PT/OT MED NEC  Physician Information:  Referring Practitioner: Chase Carter of care signed (Y/N):     Date of Patient follow up with Physician: 3/27/19    G-Code (if applicable):   77% 2/15/84   Date G-Code Applied:  3/6  PT G-Codes  Functional Assessment Tool Used: QDASH  Score: 61%  Functional Limitation: Carrying, moving and handling objects  Carrying, Moving and Handling Objects Current Status (): At least 60 percent but less than 80 percent impaired, limited or restricted  Carrying, Moving and Handling Objects Goal Status (): At least 20 percent but less than 40 percent impaired, limited or restricted    Progress Note: []  Yes  [x]  No  Next due by: Visit #20 (19)     Latex Allergy:  [x]NO      []YES  Preferred Language for Healthcare:   [x]English       []other:    Visit # Insurance Allowable Requires auth   11 BMN    [x]no        []yes:     Pain level:  9/10     SUBJECTIVE: Patient reports his shoulder feels worse than ever. He did see MD today and he wants should PT on hold until MRI and will follow up with MD in 2 weeks.       OBJECTIVE:   Observation:   Poor postural awareness, very forward head  Test measurements:      RESTRICTIONS/PRECAUTIONS:  Dementia,DM, neuropathy, R TKA, hx of CA, HTN, FALL RISK      Exercises/Interventions:   Therapeutic Ex Sets/rep comments    seated chin tucks  Difficult initiating   ER/IR isos  HEP   Scap squeezes  HEP   Long arc quad  Marching  HEP  HEP   AAROM Abd ring FF and Rows seated   Cane ER S    Candace line posture exercise    Active elevation reach behind R ear    Active shld flexion w short lever arm    AAROM knee flexion S in sitting     TB row seated     Standing arm flexion S on wedge Posterior lean   R UE reaching forward, diagonals to cones standing Cues for knee ext   No money seated  TB IR  TB row    SL ER  SL flexion    Supine punches  Supine D1 flexion Finisher table B flexion slide    Seated LAQ  March  HSC 1. 5. # x20 R/L  1.5# x20 R/L  Green TB x20 R/L    Standing hip abd 1.5# x20 R/L   L hand on wall             Pt ed/wife: POC, , use of walker, posture, st,  5'                   Manual Intervention     PROM shoulder in supine ER, abd, IR and FF, gentle oscillation, gentle STM to UT and Bicep, STM to cervical paraspinals                                   NMR re-education      Gait training, walker heightadjustment posture with walker  Cues for posture   Double stance, Modified Tandem R/L rearfoot  Gait belt and Air-Ex   rhomberg balance with head turns x10 R/L Floor/SBA   B Hip flexion on Air -Ex/Abd Stair training B handrails 1 stair at a time   Sit to stand mod assist and 2 hand assist     Double Stance on Air-Ex/scap squeeze  CGA   Balance w gait Tandem Amb x 3 laps and HF w/ Heel strike x 3 laps, Backward amb 1/2 lap  W/ Gait belt CGA, posterior hips, difficulty maneuvering walker backwards   Sit to stand B abde 3x5 From plinth, SBA, posture cues   Side stepping along 1/2 wall 3x L hand on wall            Therapeutic Exercise and NMR EXR  [x] (43122) Provided verbal/tactile cueing for activities related to strengthening, flexibility, endurance, ROM  for improvements in scapular, scapulothoracic and UE control with self care, reaching, carrying, minutes face-to-face)  [] EVAL (HIGH) 90199 (typically 45 minutes face-to-face)  [] RE-EVAL     [x] BH(85801) x  2   [] IONTO  [x] NMR (28437) x  1   [] VASO  [] Manual (74466) x       [] Other:  [] TA x       [] Mech Traction (06353)  [] ES(attended) (06096)      [] ES (un) (97595):     GOALS:  Patient stated goal: decrease pain in shoulder     Therapist goals for Patient:   Short Term Goals: To be achieved in: 2 weeks  1. Independent in HEP and progression per patient tolerance, in order to prevent re-injury. 2. Patient will have a decrease in pain to facilitate improvement in movement, function, and ADLs as indicated by Functional Deficits.     Long Term Goals: To be achieved by:  5/31/19  1. Disability index score of 30% or less for the [facility] to assist with reaching prior level of function. --no progress  2. Patient will demonstrate increased AROM to = to L side to allow for proper joint functioning as indicated by patients Functional Deficits. --progressing  3. Patient will demonstrate an increase in Strength to grossly 4/5 in shoulder and scapula; 5/5 in LEs to allow for proper functional mobility as indicated by patients Functional Deficits. Slight progress  4. Patient will demonstrate good mechanics and safety awareness with gait, sit to stand, stand to sit, and stairs  without increased symptoms or restriction. --progressing  5. Patient will be able to tolerate WB through R UE on RW for 300' or greater without increased symptoms in the shoulder. --progressing  6. Pt will complete TUG test in </= 19\" with AD to demonstrate decreased fall risk. Progression Towards Functional goals:  [] Patient is progressing as expected towards functional goals listed. [x] Progression is slowed due to complexities listed. [] Progression has been slowed due to co-morbidities.   [] Plan just implemented, too soon to assess goals progression  [x] Other: Shoulder     ASSESSMENT:  Continued cues for posture throughout. Intermittent rest breaks needed for LB due to stenosis. SBA for balance activities and sit to stand from plinth due to unsteadiness and minor LOB. Cues for hip activation with sit to stand and standing.       Treatment/Activity Tolerance:  [] Patient tolerated treatment well [x] Patient limited by fatique  [x] Patient limited by pain  [] Patient limited by other medical complications  [] Other:     Prognosis: [] Good [x] Fair  [] Poor    Patient Requires Follow-up: [x] Yes  [] No    PLAN: Hold PT pending MRI and next MD follow up  [] Continue per plan of care [x] Alter current plan (see comments)  [] Plan of care initiated [] Hold pending MD visit [] Discharge    Electronically signed by: Gregory Prescott, PT PT

## 2019-04-25 ENCOUNTER — HOSPITAL ENCOUNTER (OUTPATIENT)
Dept: MRI IMAGING | Age: 79
Discharge: HOME | End: 2019-04-25
Payer: MEDICARE

## 2019-04-25 DIAGNOSIS — M25.511 RIGHT SHOULDER PAIN, UNSPECIFIED CHRONICITY: ICD-10-CM

## 2019-04-25 PROCEDURE — 73221 MRI JOINT UPR EXTREM W/O DYE: CPT

## 2019-05-08 ENCOUNTER — OFFICE VISIT (OUTPATIENT)
Dept: ORTHOPEDIC SURGERY | Age: 79
End: 2019-05-08
Payer: MEDICARE

## 2019-05-08 VITALS — HEIGHT: 72 IN | BODY MASS INDEX: 29.8 KG/M2 | WEIGHT: 220 LBS

## 2019-05-08 DIAGNOSIS — M75.121 COMPLETE TEAR OF RIGHT ROTATOR CUFF: Primary | ICD-10-CM

## 2019-05-08 PROCEDURE — 20610 DRAIN/INJ JOINT/BURSA W/O US: CPT | Performed by: ORTHOPAEDIC SURGERY

## 2019-05-08 PROCEDURE — 99213 OFFICE O/P EST LOW 20 MIN: CPT | Performed by: ORTHOPAEDIC SURGERY

## 2019-05-08 NOTE — PROGRESS NOTES
SITE: RIGHT SHOULDER    MARCAINE  NDC# [id number]  LOT NUMBER#  [id number]    DEPO 40MG  NDC# 6608-7030-63  LOT NUMBER#  [id number]    LIDOCAINE    NDC# 4455-2897-95  LOT NUMBER#  [id number]

## 2019-05-09 NOTE — PROGRESS NOTES
MD Nisreen Steel, [place]         Orthopaedic Surgery and Sports Medicine      Patient Name: Marzena Logan  YOB: 1940  Date of Encounter: 5/9/2019  Patient's PCP is Lencho Rojas MD    SUBJECTIVE  Chief Complaint:  Shoulder Pain (RIGHT    MRI RESULTS)      History of Present Illness:  Marzena Logan is an 78-year-old gentleman who is well-known to us. He is a previously very active individual.  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 Is here for follow-up of an injury to his right shoulder. He was last seen on 4/24/19 referred for an MRI scan. He recently has had some problems with his gait and his balance including several falls. He has a long history of low back problems. He is currently using a walker for ambulation assistance, which we recommended at his last appointment. He is following the 2 times relatively recently. He fell on February 9, 2019 and again on February 24, 2019. On February 24 he fell forward and landed on his face and sustained facial trauma with ecchymosis around both eyes and also had some mild injuries to his right shoulder and his left knee. He is been attending outpatient physical therapy which has helped a great deal.  His wife reports that she has seen some improvement in the strength of his lower extremities. He is here today to review the results of that MRI scan. He states that he actually is feeling a little bit better. He seems to be better today not quite as uncomfortable with mobility. He's ambulating with the assistance of his walker using both upper extremities.       Pain Assessment:  Pain Assessment  Location of Pain: Shoulder  Location Modifiers: Right  Severity of Pain: 7  Quality of Pain: Dull, Aching  Duration of Pain: Persistent  Frequency of Pain: Constant  Aggravating Factors: Stretching, Straightening, Exercise  Relieving Factors: Rest  Result of Injury: Yes  Work-Related Injury: No  Are there other pain locations you wish to document?: No    Past Medical History:  Past Medical History:   Diagnosis Date    Arthritis     Cancer ([facility] [address] 2004    prostate    Diabetes mellitus ([facility] [address]     Edema     Hyperlipidemia     Joint pain     Neuropathy     Osteoarthritis     Spinal stenosis        Past Surgical History:  Past Surgical History:   Procedure Laterality Date    CATARACT REMOVAL      EYE SURGERY      cataract    JOINT REPLACEMENT Right 06/06/13    right total knee replacement    PROSTATECTOMY  2004       Allergies:  No Known Allergies    Medications:  Current Outpatient Medications   Medication Sig Dispense Refill    methylPREDNISolone (MEDROL, PHI,) 4 MG tablet Take by mouth as directed on package insert 1 kit 0    Pregabalin (LYRICA PO) Take by mouth      enoxaparin (LOVENOX) 30 MG/0.3ML injection Inject 0.3 mLs into the skin 2 times daily. 40 Syringe 0    loratadine (WAL-ITIN) 10 MG tablet Take 10 mg by mouth daily.  naproxen (NAPROSYN) 500 MG tablet Take 1,000 mg by mouth daily.  metformin (GLUCOPHAGE) 500 MG tablet Take 500 mg by mouth 2 times daily (with meals).  lovastatin (MEVACOR) 40 MG tablet Take 40 mg by mouth nightly.  aspirin 81 MG EC tablet Take 81 mg by mouth daily.  gabapentin (NEURONTIN) 300 MG capsule Take 300 mg by mouth 4 times daily.  Glucosamine-Chondroit-Vit C-Mn (GLUCOSAMINE 1500 COMPLEX PO) Take  by mouth daily. No current facility-administered medications for this visit. Review of Systems:  Surjit Lyman's review of systems has been performed by intake and observation. All past and current ROS forms have been scanned into the medical record. She has been instructed to contact her primary care provider regarding ROS issues if not already being addressed at this time. There are no recent changes. The most recent ROS was scanned into media on 02/20/2019       OBJECTIVE  PHYSICAL EXAM  Vital Signs: There were no vitals filed for this visit. Body mass index is 29.84 kg/m². General Exam:   Constitutional: Patient is adequately groomed with no evidence of malnutrition  Mental Status: The patient is oriented to time, place and person. The patient's mood and affect are appropriate. Neurological: The patient has good coordination. There is no weakness or sensory deficit. Right Shoulder Examination, his physical examination the right shoulder was repeated again today. There has been no significant change. Inspection:    Visual deformity noted: No     no swelling noted. No erythema or ecchymosis. Skin is intact with no cellulitis, rashes, ulcerations, lymphedema     or cutaneous lesions noted. Palpation:  Minimal tenderness to palpation today and there is no areas of significant local tenderness. Range of Motion: shoulder range of motion actually does continue to improve he can elevate to about 150° today of forward flexion and about 90 of abduction. He does wince with that some discomfort when he performs his active range of motion. Special Tests: sign of instability. Strength: Forward flexion: 4/5        Abduction: 4/5        Internal Rotation: 5/5        External Rotation: 5/5    Neurologic & Vascular: Sensation to intact to light touch throughout median, ulnar and radial nerve distribution. The bilateral upper extremities are warm and well-perfused with brisk capillary refill. Additional Examinations:  Right Lower Extremity: Examination of the right lower extremity does not show any tenderness, deformity or injury. Small effusion noted. Evidence of previous total knee arthroplasty is in place. Incision is clean, dry and intact. Range of motion is within normal limits. Some pain with full flexion. There is no gross instability.   There are no rashes, ulcerations or lesions. Strength and tone are normal.  Left Upper Extremity: Examination of the left upper extremity does not show any tenderness, deformity or injury. Range of motion is within normal limits. There is no gross instability. There are no rashes, ulcerations or lesions. Strength and tone are normal.  Neck: Examination of the neck does not show any tenderness, deformity or injury. Range of motion is within normal limits. There is no gross instability. There are no rashes, ulcerations or lesions. Strength and tone are normal.    MRI scan was reviewed today both the images as well as the report.he has a full-thickness supraspinatus and subscapularis rotator cuff tear his infraspinatus shows an interstitial tearing. Appears to have a chronic tear of his biceps tendon. He also has degenerative changes of the glenohumeral joint. ASSESSMENT (Medical Decision Making)    Ta Barreto is a 78 y.o. male with the following diagnosis: Right shoulder pain with massive rotator cuff tear      ICD-10-CM    1. Complete tear of right rotator cuff M75.121 IN METHYLPREDNISOLONE 40 MG INJ     57146 - IN DRAIN/INJECT LARGE JOINT/BURSA         PLAN (Medical Decision Making)  Office Procedures:  Orders Placed This Encounter   Procedures    IN METHYLPREDNISOLONE 40 MG INJ    50369 - IN DRAIN/INJECT LARGE JOINT/BURSA       Treatment Plan:    After a long discussion with him his wife and his son we've elected not to rapidly proceed with any type of surgical intervention. He is feeling somewhat better. We know that he has a very large rotator cuff tear that may be very difficult to fix. The current plan today was to inject his shoulder using Depo-Medrol and Marcaine which he tolerated well. The risks and benefits of an injection were discussed with the patient. The patient had full opportunity to ask questions and all were answered. The patient then provided verbal informed consent.   The

## 2019-06-19 ENCOUNTER — OFFICE VISIT (OUTPATIENT)
Dept: ORTHOPEDIC SURGERY | Age: 79
End: 2019-06-19
Payer: MEDICARE

## 2019-06-19 VITALS — WEIGHT: 220 LBS | BODY MASS INDEX: 29.8 KG/M2 | HEIGHT: 72 IN

## 2019-06-19 DIAGNOSIS — S46.011D TRAUMATIC COMPLETE TEAR OF RIGHT ROTATOR CUFF, SUBSEQUENT ENCOUNTER: Primary | ICD-10-CM

## 2019-06-19 PROCEDURE — 99213 OFFICE O/P EST LOW 20 MIN: CPT | Performed by: ORTHOPAEDIC SURGERY

## 2019-06-19 NOTE — PROGRESS NOTES
MD Nisreen Steel, [place]         Orthopaedic Surgery and Sports Medicine      Patient Name: Marzena Logan  YOB: 1940  Date of Encounter: 6/19/2019  Patient's PCP is Lencho Rojas MD    SUBJECTIVE  Chief Complaint:  Shoulder Pain (RIGHT     INJECTION LAST VISIT)      History of Present Illness:  Marzena Logan is an 70-year-old gentleman who is well-known to us. He is a previously very active individual.  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 Is here for follow-up of an injury to his right shoulder. He is a gentleman whose had an MRI scan which showed relatively large rotator cuff tear but because of other medical problems and other issues he did not desire surgical intervention if at all possible. He had a previous injection of the subacromial space using Depo-Medrol and Marcaine. That most recent injection was on 5/8/2019. He is showing improvement. Continues to have some symptoms with limited range of motion and weakness. But much less discomfort.     Pain Assessment:  Pain Assessment  Location of Pain: Shoulder  Location Modifiers: Right  Severity of Pain: 3  Quality of Pain: Dull, Aching  Frequency of Pain: Intermittent  Aggravating Factors: Stretching, Straightening, Exercise  Relieving Factors: Rest, Other (Comment)  Result of Injury: Yes  Work-Related Injury: No  Are there other pain locations you wish to document?: No    Past Medical History:  Past Medical History:   Diagnosis Date    Arthritis     Cancer ([facility] [address] 2004    prostate    Diabetes mellitus ([facility] [address]     Edema     Hyperlipidemia     Joint pain     Neuropathy     Osteoarthritis     Spinal stenosis        Past Surgical History:  Past Surgical History:   Procedure Laterality Date    CATARACT REMOVAL      EYE SURGERY      cataract    JOINT REPLACEMENT Right 06/06/13    right total knee replacement    PROSTATECTOMY  2004       Allergies:  No Known Allergies    Medications:  Current Outpatient Medications   Medication Sig Dispense Refill    methylPREDNISolone (MEDROL, PHI,) 4 MG tablet Take by mouth as directed on package insert 1 kit 0    Pregabalin (LYRICA PO) Take by mouth      enoxaparin (LOVENOX) 30 MG/0.3ML injection Inject 0.3 mLs into the skin 2 times daily. 40 Syringe 0    loratadine (WAL-ITIN) 10 MG tablet Take 10 mg by mouth daily.  naproxen (NAPROSYN) 500 MG tablet Take 1,000 mg by mouth daily.  metformin (GLUCOPHAGE) 500 MG tablet Take 500 mg by mouth 2 times daily (with meals).  lovastatin (MEVACOR) 40 MG tablet Take 40 mg by mouth nightly.  aspirin 81 MG EC tablet Take 81 mg by mouth daily.  gabapentin (NEURONTIN) 300 MG capsule Take 300 mg by mouth 4 times daily.  Glucosamine-Chondroit-Vit C-Mn (GLUCOSAMINE 1500 COMPLEX PO) Take  by mouth daily. No current facility-administered medications for this visit. Review of Systems:  Nehemias Lyman's review of systems has been performed by intake and observation. All past and current ROS forms have been scanned into the medical record. She has been instructed to contact her primary care provider regarding ROS issues if not already being addressed at this time. There are no recent changes. The most recent ROS was scanned into media on 02/20/2019       OBJECTIVE  PHYSICAL EXAM  Vital Signs: There were no vitals filed for this visit. Body mass index is 29.84 kg/m². General Exam:   Constitutional: Patient is adequately groomed with no evidence of malnutrition  Mental Status: The patient is oriented to time, place and person. The patient's mood and affect are appropriate. Neurological: The patient has good coordination. There is no weakness or sensory deficit. Right Shoulder Examination, today repeat examination was performed. Inspection:    Visual deformity noted: No     no swelling noted. No erythema or ecchymosis. Skin is intact with no cellulitis, rashes, ulcerations, lymphedema     or cutaneous lesions noted. Palpation:  Is very little tenderness to palpation today around the rotator cuff and anterior lateral aspects of the acromion. Range of Motion: Shoulder range of motion today is improved over his last visit. Still far from normal.  He can elevate to about 120 degrees of forward flexion and almost 90 degrees of abduction today. Special Tests: No sign of instability. Strength: Forward flexion: 4/5        Abduction: 4/5        Internal Rotation: 5/5        External Rotation: 5/5    Neurologic & Vascular: Sensation to intact to light touch throughout median, ulnar and radial nerve distribution. The bilateral upper extremities are warm and well-perfused with brisk capillary refill. Additional Examinations:  Right Lower Extremity: Examination of the right lower extremity does not show any tenderness, deformity or injury. Small effusion noted. Evidence of previous total knee arthroplasty is in place. Incision is clean, dry and intact. Range of motion is within normal limits. Some pain with full flexion. There is no gross instability. There are no rashes, ulcerations or lesions. Strength and tone are normal.  Left Upper Extremity: Examination of the left upper extremity does not show any tenderness, deformity or injury. Range of motion is within normal limits. There is no gross instability. There are no rashes, ulcerations or lesions. Strength and tone are normal.  Neck: Examination of the neck does not show any tenderness, deformity or injury. Range of motion is within normal limits. There is no gross instability. There are no rashes, ulcerations or lesions.   Strength and tone are normal.      ASSESSMENT (Medical Decision Making)    Chandrakant Ash is a 78 y.o. male with the following diagnosis: Right shoulder pain with massive rotator cuff tear      ICD-10-CM    1. Traumatic complete tear of right rotator cuff, subsequent encounter S46.011D          PLAN (Medical Decision Making)  Office Procedures:  No orders of the defined types were placed in this encounter. Treatment Plan:    He is doing better. The current plan is to revisit his exercise program which she is doing at home. He is somewhat forgetful as far as that program.  So that was reviewed with him today by our . He will follow-up with us again in 3 months. Sharron Pineda was instructed to call the office if his symptoms worsen or if new symptoms appear prior to the next scheduled visit. He is specifically instructed to contact the office between now and schedule appointment if he has concerns related to his condition or if he needs assistance in scheduling any above tests. He is welcome to call for an appointment sooner if he has any additional concerns or questions. This dictation was performed with a verbal recognition program (DRAGON) and it was checked for errors. It is possible that there are still dictated errors within this office note. If so, please bring any errors to my attention for an addendum. All efforts were made to ensure that this office note is accurate.

## 2019-09-11 ENCOUNTER — OFFICE VISIT (OUTPATIENT)
Dept: ORTHOPEDIC SURGERY | Age: 79
End: 2019-09-11
Payer: MEDICARE

## 2019-09-11 VITALS — BODY MASS INDEX: 29.8 KG/M2 | WEIGHT: 220 LBS | HEIGHT: 72 IN

## 2019-09-11 DIAGNOSIS — M25.511 RIGHT SHOULDER PAIN, UNSPECIFIED CHRONICITY: Primary | ICD-10-CM

## 2019-09-11 PROCEDURE — 99212 OFFICE O/P EST SF 10 MIN: CPT | Performed by: ORTHOPAEDIC SURGERY

## 2019-09-15 NOTE — PROGRESS NOTES
MD Ham Rodríguez, [place]         Orthopaedic Surgery and Sports Medicine  Encounter date: 9/11/2019    Patient Name: Romario Matta  YOB: 1940  Patient's PCP is Chaim Aparicio MD  Last seen 6/19/2019      SUBJECTIVE  Chief Complaint:  Shoulder Pain (RIGHT)      History of Present Illness:  Romario Matta is an 17-year-old gentleman who is well-known to us. He is a previously very active individual.  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 Is here for follow-up of an injury to his right shoulder. Currently being treated non-op for a large right shoulder rotator cuff tear. He had a previous injection of the subacromial space using Depo-Medrol and Marcaine. He is showing improvement. Continues to have some symptoms with limited range of motion and weakness. But much less discomfort.       Pain Assessment:  Pain Assessment  Location of Pain: Shoulder  Location Modifiers: Right  Severity of Pain: 5  Quality of Pain: Dull, Aching  Frequency of Pain: Intermittent  Aggravating Factors: Stretching, Straightening, Exercise  Relieving Factors: Rest  Result of Injury: Yes  Work-Related Injury: No  Are there other pain locations you wish to document?: No    Past Medical History:  Past Medical History:   Diagnosis Date    Arthritis     Cancer ([address] 2004    prostate    Diabetes mellitus ([address]     Edema     Hyperlipidemia     Joint pain     Neuropathy     Osteoarthritis     Spinal stenosis        Past Surgical History:  Past Surgical History:   Procedure Laterality Date    CATARACT REMOVAL      EYE SURGERY      cataract    JOINT REPLACEMENT Right 06/06/13    right total knee replacement    PROSTATECTOMY  2004       Allergies:  No Known Allergies    Medications:  Current Outpatient Medications   Medication Sig Dispense additional concerns or questions. This dictation was performed with a verbal recognition program (DRAGON) and it was checked for errors. It is possible that there are still dictated errors within this office note. If so, please bring any errors to my attention for an addendum. All efforts were made to ensure that this office note is accurate.

## 2019-12-11 ENCOUNTER — OFFICE VISIT (OUTPATIENT)
Dept: ORTHOPEDIC SURGERY | Age: 79
End: 2019-12-11
Payer: MEDICARE

## 2019-12-11 VITALS — HEIGHT: 72 IN | WEIGHT: 220 LBS | BODY MASS INDEX: 29.8 KG/M2

## 2019-12-11 DIAGNOSIS — M25.511 RIGHT SHOULDER PAIN, UNSPECIFIED CHRONICITY: Primary | ICD-10-CM

## 2019-12-11 PROCEDURE — 99213 OFFICE O/P EST LOW 20 MIN: CPT | Performed by: ORTHOPAEDIC SURGERY

## 2022-10-21 ENCOUNTER — APPOINTMENT (OUTPATIENT)
Dept: CT IMAGING | Age: 82
DRG: 309 | End: 2022-10-21
Payer: MEDICARE

## 2022-10-21 ENCOUNTER — HOSPITAL ENCOUNTER (INPATIENT)
Age: 82
LOS: 7 days | Discharge: HOME | DRG: 309 | End: 2022-10-28
Attending: EMERGENCY MEDICINE | Admitting: INTERNAL MEDICINE
Payer: MEDICARE

## 2022-10-21 DIAGNOSIS — I63.9 CEREBROVASCULAR ACCIDENT (CVA), UNSPECIFIED MECHANISM (HCC): ICD-10-CM

## 2022-10-21 DIAGNOSIS — M48.00 SPINAL STENOSIS, UNSPECIFIED SPINAL REGION: Chronic | ICD-10-CM

## 2022-10-21 DIAGNOSIS — R46.89 AGGRESSIVE BEHAVIOR: Primary | ICD-10-CM

## 2022-10-21 DIAGNOSIS — I48.91 NEW ONSET ATRIAL FIBRILLATION (HCC): ICD-10-CM

## 2022-10-21 LAB
A/G RATIO: 1.2 (ref 1.1–2.2)
ACETAMINOPHEN LEVEL: <5 UG/ML (ref 10–30)
ALBUMIN SERPL-MCNC: 3.8 G/DL (ref 3.4–5)
ALP BLD-CCNC: 134 U/L (ref 40–129)
ALT SERPL-CCNC: 29 U/L (ref 10–40)
AMORPHOUS: ABNORMAL /HPF
AMPHETAMINE SCREEN, URINE: NORMAL
ANION GAP SERPL CALCULATED.3IONS-SCNC: 7 MMOL/L (ref 3–16)
AST SERPL-CCNC: 31 U/L (ref 15–37)
BACTERIA: ABNORMAL /HPF
BARBITURATE SCREEN URINE: NORMAL
BASOPHILS ABSOLUTE: 0.1 K/UL (ref 0–0.2)
BASOPHILS RELATIVE PERCENT: 0.6 %
BENZODIAZEPINE SCREEN, URINE: NORMAL
BILIRUB SERPL-MCNC: 0.4 MG/DL (ref 0–1)
BILIRUBIN URINE: NEGATIVE
BLOOD, URINE: ABNORMAL
BUN BLDV-MCNC: 29 MG/DL (ref 7–20)
CALCIUM SERPL-MCNC: 9.2 MG/DL (ref 8.3–10.6)
CANNABINOID SCREEN URINE: NORMAL
CHLORIDE BLD-SCNC: 101 MMOL/L (ref 99–110)
CLARITY: CLEAR
CO2: 27 MMOL/L (ref 21–32)
COCAINE METABOLITE SCREEN URINE: NORMAL
COLOR: YELLOW
CREAT SERPL-MCNC: 0.8 MG/DL (ref 0.8–1.3)
EOSINOPHILS ABSOLUTE: 0.1 K/UL (ref 0–0.6)
EOSINOPHILS RELATIVE PERCENT: 1.2 %
EPITHELIAL CELLS, UA: ABNORMAL /HPF (ref 0–5)
ETHANOL: NORMAL MG/DL (ref 0–0.08)
FENTANYL SCREEN, URINE: NORMAL
GFR SERPL CREATININE-BSD FRML MDRD: >60 ML/MIN/{1.73_M2}
GLUCOSE BLD-MCNC: 149 MG/DL (ref 70–99)
GLUCOSE URINE: NEGATIVE MG/DL
HCT VFR BLD CALC: 37.6 % (ref 40.5–52.5)
HEMOGLOBIN: 12.7 G/DL (ref 13.5–17.5)
KETONES, URINE: NEGATIVE MG/DL
LEUKOCYTE ESTERASE, URINE: ABNORMAL
LYMPHOCYTES ABSOLUTE: 1.9 K/UL (ref 1–5.1)
LYMPHOCYTES RELATIVE PERCENT: 20.8 %
Lab: NORMAL
MCH RBC QN AUTO: 29.2 PG (ref 26–34)
MCHC RBC AUTO-ENTMCNC: 33.7 G/DL (ref 31–36)
MCV RBC AUTO: 86.8 FL (ref 80–100)
METHADONE SCREEN, URINE: NORMAL
MICROSCOPIC EXAMINATION: YES
MONOCYTES ABSOLUTE: 0.5 K/UL (ref 0–1.3)
MONOCYTES RELATIVE PERCENT: 5.2 %
NEUTROPHILS ABSOLUTE: 6.4 K/UL (ref 1.7–7.7)
NEUTROPHILS RELATIVE PERCENT: 72.2 %
NITRITE, URINE: NEGATIVE
OPIATE SCREEN URINE: NORMAL
OXYCODONE URINE: NORMAL
PDW BLD-RTO: 13.6 % (ref 12.4–15.4)
PH UA: 6.5
PH UA: 6.5 (ref 5–8)
PHENCYCLIDINE SCREEN URINE: NORMAL
PLATELET # BLD: 220 K/UL (ref 135–450)
PMV BLD AUTO: 7.5 FL (ref 5–10.5)
POTASSIUM REFLEX MAGNESIUM: 4.9 MMOL/L (ref 3.5–5.1)
PRO-BNP: 323 PG/ML (ref 0–449)
PROTEIN UA: NEGATIVE MG/DL
RBC # BLD: 4.33 M/UL (ref 4.2–5.9)
RBC UA: ABNORMAL /HPF (ref 0–4)
SALICYLATE, SERUM: <0.3 MG/DL (ref 15–30)
SARS-COV-2, NAAT: NOT DETECTED
SODIUM BLD-SCNC: 135 MMOL/L (ref 136–145)
SPECIFIC GRAVITY UA: 1.01 (ref 1–1.03)
TOTAL PROTEIN: 7.1 G/DL (ref 6.4–8.2)
TROPONIN: <0.01 NG/ML
URINE TYPE: ABNORMAL
UROBILINOGEN, URINE: 0.2 E.U./DL
WBC # BLD: 8.9 K/UL (ref 4–11)
WBC UA: ABNORMAL /HPF (ref 0–5)

## 2022-10-21 PROCEDURE — 83880 ASSAY OF NATRIURETIC PEPTIDE: CPT

## 2022-10-21 PROCEDURE — 84484 ASSAY OF TROPONIN QUANT: CPT

## 2022-10-21 PROCEDURE — 80143 DRUG ASSAY ACETAMINOPHEN: CPT

## 2022-10-21 PROCEDURE — 70450 CT HEAD/BRAIN W/O DYE: CPT

## 2022-10-21 PROCEDURE — 80053 COMPREHEN METABOLIC PANEL: CPT

## 2022-10-21 PROCEDURE — 6360000002 HC RX W HCPCS: Performed by: EMERGENCY MEDICINE

## 2022-10-21 PROCEDURE — 81001 URINALYSIS AUTO W/SCOPE: CPT

## 2022-10-21 PROCEDURE — 87635 SARS-COV-2 COVID-19 AMP PRB: CPT

## 2022-10-21 PROCEDURE — 80307 DRUG TEST PRSMV CHEM ANLYZR: CPT

## 2022-10-21 PROCEDURE — 84443 ASSAY THYROID STIM HORMONE: CPT

## 2022-10-21 PROCEDURE — 80179 DRUG ASSAY SALICYLATE: CPT

## 2022-10-21 PROCEDURE — 82077 ASSAY SPEC XCP UR&BREATH IA: CPT

## 2022-10-21 PROCEDURE — 1200000000 HC SEMI PRIVATE

## 2022-10-21 PROCEDURE — 85025 COMPLETE CBC W/AUTO DIFF WBC: CPT

## 2022-10-21 PROCEDURE — 99285 EMERGENCY DEPT VISIT HI MDM: CPT

## 2022-10-21 PROCEDURE — 93005 ELECTROCARDIOGRAM TRACING: CPT | Performed by: EMERGENCY MEDICINE

## 2022-10-21 PROCEDURE — 96372 THER/PROPH/DIAG INJ SC/IM: CPT

## 2022-10-21 RX ORDER — SODIUM CHLORIDE 0.9 % (FLUSH) 0.9 %
5-40 SYRINGE (ML) INJECTION PRN
Status: DISCONTINUED | OUTPATIENT
Start: 2022-10-21 | End: 2022-10-28 | Stop reason: HOSPADM

## 2022-10-21 RX ORDER — SODIUM CHLORIDE 9 MG/ML
INJECTION, SOLUTION INTRAVENOUS PRN
Status: DISCONTINUED | OUTPATIENT
Start: 2022-10-21 | End: 2022-10-28 | Stop reason: HOSPADM

## 2022-10-21 RX ORDER — ACETAMINOPHEN 650 MG/1
650 SUPPOSITORY RECTAL EVERY 6 HOURS PRN
Status: DISCONTINUED | OUTPATIENT
Start: 2022-10-21 | End: 2022-10-28 | Stop reason: HOSPADM

## 2022-10-21 RX ORDER — POLYETHYLENE GLYCOL 3350 17 G/17G
17 POWDER, FOR SOLUTION ORAL DAILY PRN
Status: DISCONTINUED | OUTPATIENT
Start: 2022-10-21 | End: 2022-10-28 | Stop reason: HOSPADM

## 2022-10-21 RX ORDER — CETIRIZINE HYDROCHLORIDE 10 MG/1
10 TABLET ORAL DAILY
Status: DISCONTINUED | OUTPATIENT
Start: 2022-10-22 | End: 2022-10-28 | Stop reason: HOSPADM

## 2022-10-21 RX ORDER — ACETAMINOPHEN 325 MG/1
650 TABLET ORAL EVERY 6 HOURS PRN
Status: DISCONTINUED | OUTPATIENT
Start: 2022-10-21 | End: 2022-10-28 | Stop reason: HOSPADM

## 2022-10-21 RX ORDER — SODIUM CHLORIDE 0.9 % (FLUSH) 0.9 %
5-40 SYRINGE (ML) INJECTION EVERY 12 HOURS SCHEDULED
Status: DISCONTINUED | OUTPATIENT
Start: 2022-10-22 | End: 2022-10-28 | Stop reason: HOSPADM

## 2022-10-21 RX ORDER — ENOXAPARIN SODIUM 100 MG/ML
40 INJECTION SUBCUTANEOUS DAILY
Status: DISCONTINUED | OUTPATIENT
Start: 2022-10-22 | End: 2022-10-28 | Stop reason: HOSPADM

## 2022-10-21 RX ORDER — ONDANSETRON 2 MG/ML
4 INJECTION INTRAMUSCULAR; INTRAVENOUS EVERY 6 HOURS PRN
Status: DISCONTINUED | OUTPATIENT
Start: 2022-10-21 | End: 2022-10-28 | Stop reason: HOSPADM

## 2022-10-21 RX ORDER — ATORVASTATIN CALCIUM 10 MG/1
10 TABLET, FILM COATED ORAL NIGHTLY
Status: DISCONTINUED | OUTPATIENT
Start: 2022-10-22 | End: 2022-10-28 | Stop reason: HOSPADM

## 2022-10-21 RX ORDER — ZIPRASIDONE MESYLATE 20 MG/ML
10 INJECTION, POWDER, LYOPHILIZED, FOR SOLUTION INTRAMUSCULAR ONCE
Status: COMPLETED | OUTPATIENT
Start: 2022-10-21 | End: 2022-10-21

## 2022-10-21 RX ORDER — ONDANSETRON 4 MG/1
4 TABLET, ORALLY DISINTEGRATING ORAL EVERY 8 HOURS PRN
Status: DISCONTINUED | OUTPATIENT
Start: 2022-10-21 | End: 2022-10-28 | Stop reason: HOSPADM

## 2022-10-21 RX ORDER — HALOPERIDOL 5 MG/ML
5 INJECTION INTRAMUSCULAR EVERY 6 HOURS PRN
Status: DISCONTINUED | OUTPATIENT
Start: 2022-10-21 | End: 2022-10-28 | Stop reason: HOSPADM

## 2022-10-21 RX ADMIN — ZIPRASIDONE MESYLATE 10 MG: 20 INJECTION, POWDER, LYOPHILIZED, FOR SOLUTION INTRAMUSCULAR at 19:23

## 2022-10-21 RX ADMIN — ZIPRASIDONE MESYLATE 10 MG: 20 INJECTION, POWDER, LYOPHILIZED, FOR SOLUTION INTRAMUSCULAR at 21:11

## 2022-10-21 ASSESSMENT — PAIN SCALES - GENERAL
PAINLEVEL_OUTOF10: 0
PAINLEVEL_OUTOF10: 0

## 2022-10-21 NOTE — ED TRIAGE NOTES
Pt arrives to the ED from independent living [place] [facility] [place] for aggressive behavior. Pt was to be sent to [facility] for eval d/t increased aggressive behavior, poor nutritional intake, and inability to care for himself. Pt has PMHx of dementia and is in need of higher level of care than his current facility can provide. Pt arrives c/o of a \"sore butt\". Pt was given 2.5 mg of Haldol IM yesterday for combativeness. Pt did not receive any today per EMS. Pt is calm and cooperative at this time, Alert and oriented to self, when asked where he was pt responded \"[facility] hospital\". Pt reports frequent falls recently. Pt with eccyhmosis around his Left eye that appears several days to possibly weeks old.

## 2022-10-22 PROBLEM — F03.918 DEMENTIA WITH AGGRESSIVE BEHAVIOR: Status: ACTIVE | Noted: 2022-10-22

## 2022-10-22 PROBLEM — F03.90 DEMENTIA WITHOUT BEHAVIORAL DISTURBANCE (HCC): Status: ACTIVE | Noted: 2022-10-22

## 2022-10-22 LAB
EKG ATRIAL RATE: 82 BPM
EKG DIAGNOSIS: NORMAL
EKG Q-T INTERVAL: 370 MS
EKG QRS DURATION: 88 MS
EKG QTC CALCULATION (BAZETT): 442 MS
EKG R AXIS: -10 DEGREES
EKG T AXIS: -1 DEGREES
EKG VENTRICULAR RATE: 86 BPM
INR BLD: 1 (ref 0.87–1.14)
PROTHROMBIN TIME: 13.1 SEC (ref 11.7–14.5)
TROPONIN: <0.01 NG/ML
TROPONIN: <0.01 NG/ML
TSH REFLEX: 1.12 UIU/ML (ref 0.27–4.2)
TSH REFLEX: 1.34 UIU/ML (ref 0.27–4.2)

## 2022-10-22 PROCEDURE — 6370000000 HC RX 637 (ALT 250 FOR IP): Performed by: INTERNAL MEDICINE

## 2022-10-22 PROCEDURE — 99223 1ST HOSP IP/OBS HIGH 75: CPT | Performed by: INTERNAL MEDICINE

## 2022-10-22 PROCEDURE — 84484 ASSAY OF TROPONIN QUANT: CPT

## 2022-10-22 PROCEDURE — 85610 PROTHROMBIN TIME: CPT

## 2022-10-22 PROCEDURE — 2580000003 HC RX 258: Performed by: INTERNAL MEDICINE

## 2022-10-22 PROCEDURE — 36415 COLL VENOUS BLD VENIPUNCTURE: CPT

## 2022-10-22 PROCEDURE — 84443 ASSAY THYROID STIM HORMONE: CPT

## 2022-10-22 PROCEDURE — 6360000002 HC RX W HCPCS: Performed by: INTERNAL MEDICINE

## 2022-10-22 PROCEDURE — 93010 ELECTROCARDIOGRAM REPORT: CPT | Performed by: INTERNAL MEDICINE

## 2022-10-22 PROCEDURE — 1200000000 HC SEMI PRIVATE

## 2022-10-22 PROCEDURE — 51798 US URINE CAPACITY MEASURE: CPT

## 2022-10-22 RX ORDER — CHOLECALCIFEROL (VITAMIN D3) 125 MCG
250 CAPSULE ORAL DAILY
Status: DISCONTINUED | OUTPATIENT
Start: 2022-10-22 | End: 2022-10-28 | Stop reason: HOSPADM

## 2022-10-22 RX ORDER — QUETIAPINE FUMARATE 50 MG/1
50 TABLET, EXTENDED RELEASE ORAL NIGHTLY
Status: DISCONTINUED | OUTPATIENT
Start: 2022-10-22 | End: 2022-10-28 | Stop reason: HOSPADM

## 2022-10-22 RX ORDER — ASPIRIN 81 MG/1
81 TABLET, CHEWABLE ORAL DAILY
Status: DISCONTINUED | OUTPATIENT
Start: 2022-10-22 | End: 2022-10-28 | Stop reason: HOSPADM

## 2022-10-22 RX ORDER — UBIDECARENONE 75 MG
50 CAPSULE ORAL DAILY
COMMUNITY

## 2022-10-22 RX ORDER — QUETIAPINE FUMARATE 50 MG/1
50 TABLET, EXTENDED RELEASE ORAL NIGHTLY
COMMUNITY

## 2022-10-22 RX ORDER — METHOCARBAMOL 500 MG/1
500 TABLET, FILM COATED ORAL 4 TIMES DAILY
Status: DISCONTINUED | OUTPATIENT
Start: 2022-10-22 | End: 2022-10-28 | Stop reason: HOSPADM

## 2022-10-22 RX ADMIN — ENOXAPARIN SODIUM 40 MG: 100 INJECTION SUBCUTANEOUS at 07:51

## 2022-10-22 RX ADMIN — ACETAMINOPHEN 650 MG: 325 TABLET ORAL at 20:06

## 2022-10-22 RX ADMIN — METHOCARBAMOL TABLETS 500 MG: 500 TABLET, COATED ORAL at 20:06

## 2022-10-22 RX ADMIN — CYANOCOBALAMIN TAB 500 MCG 250 MCG: 500 TAB at 14:38

## 2022-10-22 RX ADMIN — ATORVASTATIN CALCIUM 10 MG: 10 TABLET, FILM COATED ORAL at 00:25

## 2022-10-22 RX ADMIN — ACETAMINOPHEN 650 MG: 325 TABLET ORAL at 07:50

## 2022-10-22 RX ADMIN — QUETIAPINE FUMARATE 50 MG: 50 TABLET, EXTENDED RELEASE ORAL at 20:06

## 2022-10-22 RX ADMIN — CETIRIZINE HYDROCHLORIDE 10 MG: 10 TABLET, FILM COATED ORAL at 07:51

## 2022-10-22 RX ADMIN — SERTRALINE HYDROCHLORIDE 50 MG: 50 TABLET ORAL at 20:06

## 2022-10-22 RX ADMIN — HALOPERIDOL LACTATE 5 MG: 5 INJECTION, SOLUTION INTRAMUSCULAR at 07:51

## 2022-10-22 RX ADMIN — METHOCARBAMOL TABLETS 500 MG: 500 TABLET, COATED ORAL at 15:35

## 2022-10-22 RX ADMIN — ATORVASTATIN CALCIUM 10 MG: 10 TABLET, FILM COATED ORAL at 23:56

## 2022-10-22 RX ADMIN — PSYLLIUM HUSK 1 PACKET: 3.4 POWDER ORAL at 14:38

## 2022-10-22 RX ADMIN — METFORMIN HYDROCHLORIDE 500 MG: 500 TABLET ORAL at 07:50

## 2022-10-22 RX ADMIN — HALOPERIDOL LACTATE 5 MG: 5 INJECTION, SOLUTION INTRAMUSCULAR at 00:25

## 2022-10-22 RX ADMIN — SODIUM CHLORIDE, PRESERVATIVE FREE 10 ML: 5 INJECTION INTRAVENOUS at 07:51

## 2022-10-22 RX ADMIN — METFORMIN HYDROCHLORIDE 500 MG: 500 TABLET ORAL at 17:58

## 2022-10-22 ASSESSMENT — PAIN SCALES - GENERAL
PAINLEVEL_OUTOF10: 10
PAINLEVEL_OUTOF10: 4
PAINLEVEL_OUTOF10: 0

## 2022-10-22 ASSESSMENT — PAIN SCALES - WONG BAKER: WONGBAKER_NUMERICALRESPONSE: 0

## 2022-10-22 ASSESSMENT — PAIN DESCRIPTION - ORIENTATION: ORIENTATION: RIGHT;LEFT

## 2022-10-22 ASSESSMENT — PAIN DESCRIPTION - DESCRIPTORS
DESCRIPTORS: ACHING
DESCRIPTORS: ACHING

## 2022-10-22 ASSESSMENT — PAIN DESCRIPTION - LOCATION
LOCATION: BUTTOCKS
LOCATION: FOOT

## 2022-10-22 ASSESSMENT — PAIN DESCRIPTION - PAIN TYPE: TYPE: CHRONIC PAIN

## 2022-10-22 NOTE — H&P
Hospital Medicine History & Physical      PCP: Lisette Lambert MD    Date of Admission: 10/21/2022    Date of Service: Pt seen/examined on 10/21/2022 and Admitted to Inpatient with expected LOS greater than two midnights due to medical therapy. Chief Complaint: Aggressive behavior      History Of Present Illness:    78 y.o. male who presented to [facility] with above complaints  Patient with PMH of dementia, DM2, HTN, HLD, prostate CA was sent from [facility] for aggressive behavior. Patient has history of dementia and is mostly well behaved but since yesterday he has been very aggressive with staff. They gave him Haldol yesterday but he continued to have episodes of aggressive behavior today so they sent him to the ED. patient himself is very demented to endorse any complaints at this time however he does deny any chest pain or palpitations. In the ED patient was found to have new onset A. fib rate controlled on the EKG. Past Medical History:          Diagnosis Date    Arthritis     Cancer ([facility] 01/01/2004    prostate    Dementia ([facility]     Diabetes mellitus ([facility] [address] [facility]     Edema     Hyperlipidemia     Joint pain     Neuropathy     Osteoarthritis     Spinal stenosis        Past Surgical History:          Procedure Laterality Date    CATARACT REMOVAL      EYE SURGERY      cataract    JOINT REPLACEMENT Right 06/06/13    right total knee replacement    PROSTATECTOMY  2004       Medications Prior to Admission:      Prior to Admission medications    Medication Sig Start Date End Date Taking? Authorizing Provider   methylPREDNISolone (MEDROL, PHI,) 4 MG tablet Take by mouth as directed on package insert 2/15/19   LELAND Alfaro   Pregabalin (LYRICA PO) Take by mouth    Historical Provider, MD   enoxaparin (LOVENOX) 30 MG/0.3ML injection Inject 0.3 mLs into the skin 2 times daily. 6/9/13   Jacinda Rodriguez MD   loratadine (WAL-ITIN) 10 MG tablet Take 10 mg by mouth daily.     Historical Provider, MD naproxen (NAPROSYN) 500 MG tablet Take 1,000 mg by mouth daily. Historical Provider, MD   metformin (GLUCOPHAGE) 500 MG tablet Take 500 mg by mouth 2 times daily (with meals). Historical Provider, MD   lovastatin (MEVACOR) 40 MG tablet Take 40 mg by mouth nightly. Historical Provider, MD   aspirin 81 MG EC tablet Take 81 mg by mouth daily. Historical Provider, MD   gabapentin (NEURONTIN) 300 MG capsule Take 300 mg by mouth 4 times daily. Historical Provider, MD   Glucosamine-Chondroit-Vit C-Mn (GLUCOSAMINE 1500 COMPLEX PO) Take  by mouth daily. Historical Provider, MD       Allergies:  Pseudoephedrine    Social History:      The patient currently lives in nursing home    TOBACCO:   reports that he has quit smoking. He has never used smokeless tobacco.  ETOH:   reports no history of alcohol use. E-cigarette/Vaping       Questions Responses    E-cigarette/Vaping Use     Start Date     Passive Exposure     Quit Date     Counseling Given     Comments               Family History:    Reviewed and negative in regards to presenting illness/complaint. Problem Relation Age of Onset    Cancer Sister        REVIEW OF SYSTEMS COMPLETED:   Pertinent positives as noted in the HPI. All other systems reviewed and negative. PHYSICAL EXAM PERFORMED:    BP (!) 142/83   Pulse 81   Temp 97.5 °F (36.4 °C) (Oral)   Resp 18   Ht 6' 2\" (1.88 m)   Wt 163 lb 12.8 oz (74.3 kg)   SpO2 99%   BMI 21.03 kg/m²     General appearance:  No apparent distress, appears stated age and cooperative. HEENT:  Normal cephalic,  without obvious deformity. Pupils equal, round, and reactive to light. Extra ocular muscles intact. Conjunctivae/corneas clear. Ecchymosis around the left eye with bruising  Neck: Supple, with full range of motion. No jugular venous distention. Trachea midline. Respiratory:  Normal respiratory effort. Clear to auscultation, bilaterally without Rales/Wheezes/Rhonchi.   Cardiovascular: Irregularly irregular rhythm with normal Z7/B3 with loud systolic murmur along LSB, no rubs or gallops. Abdomen: Soft, non-tender, non-distended with normal bowel sounds. Musculoskeletal:  No clubbing, cyanosis or edema bilaterally. Full range of motion without deformity. Skin: Skin color, texture, turgor normal.  No rashes or lesions. Neurologic: Alert and oriented to self, does not know place or time. Neurovascularly intact without any focal sensory/motor deficits. Cranial nerves: II-XII intact, grossly non-focal.  Psychiatric:  Alert and oriented, thought content appropriate, normal insight  Capillary Refill: Brisk,3 seconds, normal  Peripheral Pulses: +2 palpable, equal bilaterally       Labs:     Recent Labs     10/21/22  1951   WBC 8.9   HGB 12.7*   HCT 37.6*        Recent Labs     10/21/22  2303   *   K 4.9      CO2 27   BUN 29*   CREATININE 0.8   CALCIUM 9.2     Recent Labs     10/21/22  2303   AST 31   ALT 29   BILITOT 0.4   ALKPHOS 134*     No results for input(s): INR in the last 72 hours. Recent Labs     10/21/22  2303 10/22/22  0034   TROPONINI <0.01 <0.01       Urinalysis:      Lab Results   Component Value Date/Time    NITRU Negative 10/21/2022 09:35 PM    WBCUA 6-9 10/21/2022 09:35 PM    BACTERIA 1+ 10/21/2022 09:35 PM    RBCUA 5-10 10/21/2022 09:35 PM    BLOODU TRACE-INTACT 10/21/2022 09:35 PM    SPECGRAV 1.015 10/21/2022 09:35 PM    GLUCOSEU Negative 10/21/2022 09:35 PM       Radiology:      I reviewed the CT head without contrast and also reviewed radiologist report    EKG:  I have reviewed the EKG with the following interpretation: A. fib, rate 86, Q waves in inferior leads present on prior EKG, no ST elevation or depression, A. fib appears to be new compared to prior EKG from 2013    [address] [address] [address]   Final Result   1. 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 These are   favored to be old, but that is not certain.    2. No acute intracranial findings elsewhere. 3. Volume loss and chronic microvascular ischemic changes. Consults:    IP CONSULT TO CARDIOLOGY    ASSESSMENT:PLAN:    Active Hospital Problems    Diagnosis Date Noted    New onset a-fib ([facility] [I48.91] 10/21/2022     Priority: High    Dementia without behavioral disturbance ([facility] [F03.90] 10/22/2022     Priority: Medium    Hyperlipidemia [E78.5] 06/07/2013    Prostate cancer ([facility] Isadore Grider 06/07/2013    DM (diabetes mellitus), type 2 ([facility] [E11.9] 06/07/2013       New onset A. fib, rate controlled  Does not seem like patient is symptomatic from this, unsure about duration of A. Fib. Etiology unclear. He has no history of HTN, no known CAD, no alcohol intake.  -Currently rate controlled, will hold off on any rate controlling meds for now and monitor on telemetry closely for any spikes in rate  -[id number] Score = 3 (age, DM2), however patient has history of falls, has a bruised left eye with ecchymosis suggesting recent fall. Would get cardiology opinion  -EP consult  -Monitor on telemetry  -Check TSH with reflex  -2D echocardiogram to look for any valvular, structural abnormalities    Systolic murmur  -No known history of valvular heart disease  -We will get 2D echocardiogram    Dementia with aggressive behavior -received IV ziprasidone in the ED. IV as needed Haldol ordered    HLD-resume statin    DM2,-resume metformin, added sliding scale insulin, Accu-Cheks, hypoglycemia protocol    DVT Prophylaxis: Lovenox  Diet: ADULT DIET; Regular  Code Status: Full Code    PT/OT Eval Status: Ambulatory    Dispo -IP stay       Elma Taylor MD    Thank you Gisela Gupta MD for the opportunity to be involved in this patient's care. If you have any questions or concerns please feel free to contact me at [phone].

## 2022-10-22 NOTE — ED PROVIDER NOTES
CHIEF COMPLAINT  Aggressive Behavior      HISTORY OF PRESENT ILLNESS  Netta Booth is a 78 y.o. male with a history of dementia, diabetes, and edema who presents to the ED complaining of aggressive behavior. Patient has a history of dementia and typically does well at the [facility]. By report, patient has become increasingly aggressive over the last couple of days. He required Haldol yesterday and continued with aggression today. .   No other complaints, modifying factors or associated symptoms. I have reviewed the following from the nursing documentation.     Past Medical History:   Diagnosis Date    Arthritis     Cancer ([facility] [address] 01/01/2004    prostate    Dementia ([facility] [address]     Diabetes mellitus ([facility] [address] [facility]     Edema     Hyperlipidemia     Joint pain     Neuropathy     Osteoarthritis     Spinal stenosis      Past Surgical History:   Procedure Laterality Date    CATARACT REMOVAL      EYE SURGERY      cataract    JOINT REPLACEMENT Right 06/06/13    right total knee replacement    PROSTATECTOMY  2004     Family History   Problem Relation Age of Onset    Cancer Sister      Social History     Socioeconomic History    Marital status:      Spouse name: Not on file    Number of children: Not on file    Years of education: Not on file    Highest education level: Not on file   Occupational History    Not on file   Tobacco Use    Smoking status: Former    Smokeless tobacco: Never    Tobacco comments:     quit 1984   Substance and Sexual Activity    Alcohol use: No    Drug use: No    Sexual activity: Not on file   Other Topics Concern    Not on file   Social History Narrative    Not on file     Social Determinants of Health     Financial Resource Strain: Not on file   Food Insecurity: Not on file   Transportation Needs: Not on file   Physical Activity: Not on file   Stress: Not on file   Social Connections: Not on file   Intimate Partner Violence: Not on file   Housing Stability: Not on file     Current Facility-Administered Medications   Medication Dose Route Frequency Provider Last Rate Last Admin    ziprasidone (GEODON) injection 10 mg  10 mg IntraMUSCular Once Romana Iba, DO         Current Outpatient Medications   Medication Sig Dispense Refill    methylPREDNISolone (MEDROL, PHI,) 4 MG tablet Take by mouth as directed on package insert 1 kit 0    Pregabalin (LYRICA PO) Take by mouth      enoxaparin (LOVENOX) 30 MG/0.3ML injection Inject 0.3 mLs into the skin 2 times daily. 40 Syringe 0    loratadine (WAL-ITIN) 10 MG tablet Take 10 mg by mouth daily. naproxen (NAPROSYN) 500 MG tablet Take 1,000 mg by mouth daily. metformin (GLUCOPHAGE) 500 MG tablet Take 500 mg by mouth 2 times daily (with meals). lovastatin (MEVACOR) 40 MG tablet Take 40 mg by mouth nightly. aspirin 81 MG EC tablet Take 81 mg by mouth daily. gabapentin (NEURONTIN) 300 MG capsule Take 300 mg by mouth 4 times daily. Glucosamine-Chondroit-Vit C-Mn (GLUCOSAMINE 1500 COMPLEX PO) Take  by mouth daily. Allergies   Allergen Reactions    Pseudoephedrine      Urinary Sxs       REVIEW OF SYSTEMS  10 systems reviewed, pertinent positives per HPI otherwise noted to be negative. PHYSICAL EXAM  /75   Pulse 80   Temp 99.3 °F (37.4 °C) (Oral)   Resp 19   Ht 6' 2\" (1.88 m)   Wt 180 lb (81.6 kg)   SpO2 98%   BMI 23.11 kg/m²   GENERAL APPEARANCE: Awake and alert. Uncooperative. Agitated. HEAD: Normocephalic. Atraumatic. EYES: PERRL. EOM's grossly intact. ENT: Mucous membranes are moist.   NECK: Supple, trachea midline. HEART: RRR. Normal S1, S2. No murmurs, rubs or gallops. LUNGS: Respirations unlabored. CTAB. Good air exchange. No wheezes, rales, or rhonchi. Speaking comfortably in full sentences. ABDOMEN: Soft. Non-distended. Non-tender. No guarding or rebound. Normal Bowel sounds. EXTREMITIES: No peripheral edema. MAEE. No acute deformities. SKIN: Candidal type rash of buttocks. Otherwise, warm and dry. No acute rashes. NEUROLOGICAL: Alert and oriented to person. . CN II-XII intact. No gross facial drooping. Strength 5/5 in all extremities. Sensation intact. No pronator drift. Normal coordination. Gait not tested. PSYCHIATRIC: Normal mood and affect. LABS  I have reviewed all labs for this visit. Results for orders placed or performed during the hospital encounter of 10/21/22   CBC with Auto Differential   Result Value Ref Range    WBC 8.9 4.0 - 11.0 K/uL    RBC 4.33 4.20 - 5.90 M/uL    Hemoglobin 12.7 (L) 13.5 - 17.5 g/dL    Hematocrit 37.6 (L) 40.5 - 52.5 %    MCV 86.8 80.0 - 100.0 fL    MCH 29.2 26.0 - 34.0 pg    MCHC 33.7 31.0 - 36.0 g/dL    RDW 13.6 12.4 - 15.4 %    Platelets 312 386 - 139 K/uL    MPV 7.5 5.0 - 10.5 fL    Neutrophils % 72.2 %    Lymphocytes % 20.8 %    Monocytes % 5.2 %    Eosinophils % 1.2 %    Basophils % 0.6 %    Neutrophils Absolute 6.4 1.7 - 7.7 K/uL    Lymphocytes Absolute 1.9 1.0 - 5.1 K/uL    Monocytes Absolute 0.5 0.0 - 1.3 K/uL    Eosinophils Absolute 0.1 0.0 - 0.6 K/uL    Basophils Absolute 0.1 0.0 - 0.2 K/uL       EKG  Atrial fibrillation with a rate of 86. Normal axis. Normal intervals and durations. Inferior Q waves noted. No T or ST wave changes noted. This has replaced normal sinus rhythm from previous EKG on 5/30/2013. RADIOLOGY  X-RAYS:  I have reviewed radiologic plain film image(s). ALL OTHER NON-PLAIN FILM IMAGES SUCH AS CT, ULTRASOUND AND MRI HAVE BEEN READ BY THE RADIOLOGIST. CT HEAD WO CONTRAST   Final Result   1. 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 These are   favored to be old, but that is not certain. 2. No acute intracranial findings elsewhere. 3. Volume loss and chronic microvascular ischemic changes. Rechecks: Physical assessment performed. Patient requiring 2 doses of Geodon in the emergency department for aggression.           Critical Care: I personally saw the patient and independently provided 35 minutes of non-concurrent critical care out of the total shared critical care time provided. Is this patient to be included in the SEP-1 Core Measure? No   Exclusion criteria - the patient is NOT to be included for SEP-1 Core Measure due to: Infection is not suspected          ED COURSE/MDM  Patient seen and evaluated. Old records reviewed. Labs and imaging reviewed and results discussed with patient. Patient was given aspirin in the ED with good symptomatic relief. Patient was reassessed as noted above . Patient's labs are stable. CT is concerning for previously undiagnosed CVAs. EKG concerning for new onset atrial fibrillation. Patient unable to be medically cleared. He will be admitted for further evaluation and treatment. . Plan of care discussed with patient and family. Patient and family in agreement with plan. Patient was given scripts for the following medications. I counseled patient how to take these medications. New Prescriptions    No medications on file       CLINICAL IMPRESSION  1. Aggressive behavior    2. New onset atrial fibrillation (HCC)    3. Cerebrovascular accident (CVA), unspecified mechanism ([facility] [address] [address]        Blood pressure 128/75, pulse 80, temperature 99.3 °F (37.4 °C), temperature source Oral, resp. rate 19, height 6' 2\" (1.88 m), weight 180 lb (81.6 kg), SpO2 98 %. DISPOSITION  Malini Hassan was admitted in stable condition.         Elizabeth Howe DO  10/21/22 1826

## 2022-10-22 NOTE — CONSULTS
[facility]  [phone]      Attending Physician: Unruly Sim MD  Reason for Consultation/Chief Complaint: Aggressive behavior    Subjective   History of Present Illness:  Samina Rice is a 78 y.o. patient who presented to the hospital with complaints of aggressive behavior, patient presented from a nursing facility, he resides there due to dementia. He was brought to emergency room yesterday and incidentally was noted to be in atrial fibrillation with rapid ventricular response. Patient is a poor historian due to dementia, history is not obtainable from him. History is per chart review. He was ultimately given Haldol, his mental status improved, heart rates also improved. From review of records, patient does not have a clear history of atrial fibrillation. Chronically does have diabetes, hypertension hypercholesterolemia. Past Medical History:   has a past medical history of Arthritis, Cancer ([facility] Dementia ([facility] [facility] Diabetes mellitus ([facility] [address] [facility] Edema, Hyperlipidemia, Joint pain, Neuropathy, Osteoarthritis, and Spinal stenosis. Surgical History:   has a past surgical history that includes Prostatectomy (2004); eye surgery; Cataract removal; and joint replacement (Right, 06/06/13). Social History:   reports that he has quit smoking. He has never used smokeless tobacco. He reports that he does not drink alcohol and does not use drugs. Family History:  family history includes Cancer in his sister. Home Medications:  Were reviewed and are listed in nursing record and/or below  Prior to Admission medications    Medication Sig Start Date End Date Taking? Authorizing Provider   methylPREDNISolone (MEDROL, PHI,) 4 MG tablet Take by mouth as directed on package insert 2/15/19   LELAND Alfaro   Pregabalin (LYRICA PO) Take by mouth    Historical Provider, MD   enoxaparin (LOVENOX) 30 MG/0.3ML injection Inject 0.3 mLs into the skin 2 times daily. 6/9/13   Dolores Garnica MD   loratadine (WAL-ITIN) 10 MG tablet Take 10 mg by mouth daily. Historical Provider, MD   naproxen (NAPROSYN) 500 MG tablet Take 1,000 mg by mouth daily. Historical Provider, MD   metformin (GLUCOPHAGE) 500 MG tablet Take 500 mg by mouth 2 times daily (with meals). Historical Provider, MD   lovastatin (MEVACOR) 40 MG tablet Take 40 mg by mouth nightly. Historical Provider, MD   aspirin 81 MG EC tablet Take 81 mg by mouth daily. Historical Provider, MD   gabapentin (NEURONTIN) 300 MG capsule Take 300 mg by mouth 4 times daily. Historical Provider, MD   Glucosamine-Chondroit-Vit C-Mn (GLUCOSAMINE 1500 COMPLEX PO) Take  by mouth daily.     Historical Provider, MD        CURRENT Medications:  cetirizine (ZYRTEC) tablet 10 mg, Daily  atorvastatin (LIPITOR) tablet 10 mg, Nightly  metFORMIN (GLUCOPHAGE) tablet 500 mg, BID WC  haloperidol lactate (HALDOL) injection 5 mg, Q6H PRN  sodium chloride flush 0.9 % injection 5-40 mL, 2 times per day  sodium chloride flush 0.9 % injection 5-40 mL, PRN  0.9 % sodium chloride infusion, PRN  ondansetron (ZOFRAN-ODT) disintegrating tablet 4 mg, Q8H PRN   Or  ondansetron (ZOFRAN) injection 4 mg, Q6H PRN  polyethylene glycol (GLYCOLAX) packet 17 g, Daily PRN  acetaminophen (TYLENOL) tablet 650 mg, Q6H PRN   Or  acetaminophen (TYLENOL) suppository 650 mg, Q6H PRN  perflutren lipid microspheres (DEFINITY) injection 1.65 mg, ONCE PRN  enoxaparin (LOVENOX) injection 40 mg, Daily        Allergies:  Pseudoephedrine     Review of Systems:      Not able to obtain from patient due to dementia     Objective   PHYSICAL EXAM:    Vitals:    10/22/22 0759   BP: 120/75   Pulse: 78   Resp: 18   Temp: 97.4 °F (36.3 °C)   SpO2: 98%    Weight: 163 lb 12.8 oz (74.3 kg)         General Appearance:  Confused, lethargic   Head:  Left periorbital ecchymoses   Throat: Lips, mucosa, and tongue dry    Neck: Supple, no jvp    Lungs:   Clear to auscultation bilaterally, respirations unlabored   Chest Wall:  No deformity or tenderness   Heart:  Irregular rate and rhythm, S1, S2 decrs, 3/6 sm    Abdomen:   Soft, non-tender,    Extremities: Extremities tc to 1+ le edema, also, noted in arms    Skin: Skin color pale   Pysch: Flat mood and affect   Neurologic:  Not able to obtain from patient due to dementia         Labs   CBC:   Lab Results   Component Value Date/Time    WBC 8.9 10/21/2022 07:51 PM    RBC 4.33 10/21/2022 07:51 PM    HGB 12.7 10/21/2022 07:51 PM    HCT 37.6 10/21/2022 07:51 PM    MCV 86.8 10/21/2022 07:51 PM    RDW 13.6 10/21/2022 07:51 PM     10/21/2022 07:51 PM     CMP:  Lab Results   Component Value Date/Time     10/21/2022 11:03 PM    K 4.9 10/21/2022 11:03 PM     10/21/2022 11:03 PM    CO2 27 10/21/2022 11:03 PM    BUN 29 10/21/2022 11:03 PM    CREATININE 0.8 10/21/2022 11:03 PM    GFRAA >60 06/08/2013 06:03 AM    AGRATIO 1.2 10/21/2022 11:03 PM    LABGLOM >60 10/21/2022 11:03 PM    GLUCOSE 149 10/21/2022 11:03 PM    PROT 7.1 10/21/2022 11:03 PM    PROT 7.1 12/03/2009 11:02 PM    CALCIUM 9.2 10/21/2022 11:03 PM    BILITOT 0.4 10/21/2022 11:03 PM    ALKPHOS 134 10/21/2022 11:03 PM    AST 31 10/21/2022 11:03 PM    ALT 29 10/21/2022 11:03 PM     PT/INR:  No results found for: PTINR  HgBA1c:  Lab Results   Component Value Date    LABA1C 6.4 06/07/2013     Lab Results   Component Value Date    TROPONINI <0.01 10/22/2022         Cardiac Data     Last EKG: Probably atrial fibrillation, baseline artifact limits interpretation, there is inferior infarct, nonspecific ST/T changes,Prior EKGs from 2013 which shows normal sinus rhythm with possible inferior infarct and poor R wave progression noted at that time    Echo:    Stress Test:    Cath:    Studies:     Ct head    Impression   1. 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 These are   favored to be old, but that is not certain.    2. No acute intracranial findings elsewhere. 3. Volume loss and chronic microvascular ischemic changes. I have reviewed labs and imaging/xray/diagnostic testing in this note. Assessment and Plan          Patient Active Problem List   Diagnosis    Arthritis of knee    DM (diabetes mellitus), type 2 ([facility] [address]    Hyperlipidemia    Prostate cancer ([facility]    S/P TKR (total knee replacement)    Peripheral neuropathy    Spinal stenosis    New onset a-fib ([facility]    Dementia with aggressive behavior       Atrial fibrillation, incidental finding, rates are controlled, patient does not appear to be a good candidate for systemic anticoagulation, can add aspirin. We will not add additional medications for rate control given controlled rates. Can consider watchman as outpatient    Probable aortic stenosis, heart murmur, obtain echocardiogram, this can be evaluated further in the outpatient setting    Dementia, as per primary service    No further inpatient cardiac work-up or treatment is planned, will sign off, please call with questions    Thank you for allowing us to participate in the care of Caryn Selby. Please call me with any questions [phone].     Lidia Garcia MD, [facility]   Interventional Cardiologist  [facility]  [phone] [facility]  [phone] [address] [place] [address]  10/22/2022 9:58 AM

## 2022-10-22 NOTE — ED NOTES
Pt provided ham sandwich with mustard, applesauce, pudding, and water on request. Pts spouse at bedside to assist pt with set up.       Tremayne June RN  10/21/22 5123

## 2022-10-22 NOTE — PROGRESS NOTES
Current  Energy (kcal/day): 2495-9179  Weight Used for Protein Requirements: Current (1-1.2)  Protein (g/day): 74-89  Method Used for Fluid Requirements: 1 ml/kcal  Fluid (ml/day): 6923-1473    Nutrition Diagnosis:   Inadequate oral intake related to inadequate protein-energy intake as evidenced by poor intake prior to admission, weight loss    Nutrition Interventions:   Food and/or Nutrient Delivery: Continue Current Diet, Start Oral Nutrition Supplement  Nutrition Education/Counseling: No recommendation at this time  Coordination of Nutrition Care: Continue to monitor while inpatient       Goals:     Goals: PO intake 50% or greater, prior to discharge       Nutrition Monitoring and Evaluation:      Food/Nutrient Intake Outcomes: Food and Nutrient Intake, Supplement Intake  Physical Signs/Symptoms Outcomes: Biochemical Data, Nutrition Focused Physical Findings, Weight    Discharge Planning:    Continue current diet, Continue Oral Nutrition Supplement     [address], [address]  Contact: [phone]

## 2022-10-22 NOTE — PROGRESS NOTES
4 Eyes Admission Assessment     I agree as the admission nurse that 2 RN's have performed a thorough Head to Toe Skin Assessment on the patient. ALL assessment sites listed below have been assessed on admission. Areas assessed by both nurses:   [x]   Head, Face, and Ears   [x]   Shoulders, Back, and Chest  [x]   Arms, Elbows, and Hands   [x]   Coccyx, Sacrum, and Ischium  [x]   Legs, Feet, and Heels        Does the Patient have Skin Breakdown? Yes a wound was noted on the Admission Assessment and an LDA was Initiated documentation include the Sara-wound, Wound Assessment, Measurements, Dressing Treatment, Drainage, and Color\",  Open skin in sacral area, open skin in B/L LE.           Joe Prevention initiated:  Yes   Wound Care Orders initiated:  Yes      [id number]  nurse consulted for Pressure Injury (Stage 3,4, Unstageable, DTI, NWPT, and Complex wounds) or Joe score 18 or lower:  No      Nurse 1 eSignature: Electronically signed by Heath Richey RN on 10/22/22 at 6:51 AM EDT    **SHARE this note so that the co-signing nurse is able to place an eSignature**    Nurse 2 eSignature: {Esignature:[id number]}

## 2022-10-22 NOTE — PROGRESS NOTES
Hospitalist Progress Note      PCP: Fish Landa MD    Date of Admission: 10/21/2022    Chief Complaint: Confusion, atrial fibrillation    Hospital Course: Patient was being transferred to inpatient rehab from memory care unit for agitation. Noted to have atrial fibrillation on route and brought to [facility] instead. Patient does not have rapid ventricular rate. Was evaluated by cardiology and nothing further was recommended. Subjective: No chest pain, no shortness of breath, no nausea, no vomiting      Medications:  Reviewed    Infusion Medications    sodium chloride       Scheduled Medications    aspirin  81 mg Oral Daily    psyllium  1 packet Oral Daily    QUEtiapine  50 mg Oral Nightly    sertraline  50 mg Oral Daily    vitamin B-12  50 mcg Oral Daily    cetirizine  10 mg Oral Daily    atorvastatin  10 mg Oral Nightly    metFORMIN  500 mg Oral BID WC    sodium chloride flush  5-40 mL IntraVENous 2 times per day    enoxaparin  40 mg SubCUTAneous Daily     PRN Meds: haloperidol lactate, sodium chloride flush, sodium chloride, ondansetron **OR** ondansetron, polyethylene glycol, acetaminophen **OR** acetaminophen, perflutren lipid microspheres      Intake/Output Summary (Last 24 hours) at 10/22/2022 1351  Last data filed at 10/22/2022 0954  Gross per 24 hour   Intake 120 ml   Output 200 ml   Net -80 ml       Physical Exam Performed:    BP (!) 147/83   Pulse 81   Temp 98.1 °F (36.7 °C) (Axillary)   Resp 20   Ht 6' 2\" (1.88 m)   Wt 163 lb 12.8 oz (74.3 kg)   SpO2 98%   BMI 21.03 kg/m²     General appearance: No apparent distress, appears stated age and cooperative. HEENT: Pupils equal, round, and reactive to light. Conjunctivae/corneas clear. Neck: Supple, with full range of motion. No jugular venous distention. Trachea midline. Respiratory:  Normal respiratory effort. Clear to auscultation, bilaterally without Rales/Wheezes/Rhonchi.   Cardiovascular: Irregularly irregular rhythm with normal S1/S2 without murmurs, rubs or gallops. Abdomen: Soft, non-tender, non-distended with normal bowel sounds. Musculoskeletal: No clubbing, cyanosis or edema bilaterally. Full range of motion without deformity. Skin: Skin color, texture, turgor normal.  No rashes or lesions. Neurologic:  Neurovascularly intact without any focal sensory/motor deficits. Cranial nerves: II-XII intact, grossly non-focal.  Psychiatric: Alert and confused. Not agitated  Capillary Refill: Brisk, 3 seconds, normal   Peripheral Pulses: +2 palpable, equal bilaterally       Labs:   Recent Labs     10/21/22  1951   WBC 8.9   HGB 12.7*   HCT 37.6*        Recent Labs     10/21/22  2303   *   K 4.9      CO2 27   BUN 29*   CREATININE 0.8   CALCIUM 9.2     Recent Labs     10/21/22  2303   AST 31   ALT 29   BILITOT 0.4   ALKPHOS 134*     Recent Labs     10/22/22  0544   INR 1.00     Recent Labs     10/21/22  2303 10/22/22  0034 10/22/22  0544   TROPONINI <0.01 <0.01 <0.01       Urinalysis:      Lab Results   Component Value Date/Time    NITRU Negative 10/21/2022 09:35 PM    WBCUA 6-9 10/21/2022 09:35 PM    BACTERIA 1+ 10/21/2022 09:35 PM    RBCUA 5-10 10/21/2022 09:35 PM    BLOODU TRACE-INTACT 10/21/2022 09:35 PM    SPECGRAV 1.015 10/21/2022 09:35 PM    GLUCOSEU Negative 10/21/2022 09:35 PM       Radiology:  CT HEAD WO CONTRAST   Final Result   1. 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 These are   favored to be old, but that is not certain. 2. No acute intracranial findings elsewhere. 3. Volume loss and chronic microvascular ischemic changes.                  Assessment/Plan:    Active Hospital Problems    Diagnosis     Dementia with aggressive behavior [C07.131]      Priority: Medium    New onset a-fib ([facility] [I48.91]      Priority: Medium    Hyperlipidemia [E78.5]     Prostate cancer ([facility] Yoly Woods     DM (diabetes mellitus), type 2 ([facility] [E11.9]      PLAN:    Atrial fibrillation  Rate controlled. Not a candidate for therapeutic anticoagulation. Has refused echocardiogram.  Nothing further from this standpoint. Agitation with dementia  Patient has dementia with well-known aggressive behavior with agitation. Was stable today. Continue to monitor. Dyslipidemia  Patient has been on statin. Continue same    Diabetes mellitus type 2  Diet controlled. Monitor only. Discussed with nursing    DVT Prophylaxis: Lovenox  Diet: ADULT DIET;  Regular  ADULT ORAL NUTRITION SUPPLEMENT; Breakfast, Lunch, Dinner; Standard High Calorie/High Protein Oral Supplement  Code Status: Full Code  PT/OT Eval Status: Ordered    Dispo -inpatient stay pending stabilization    Appropriate for A1 Discharge Unit: Eduarda Aparicio MD

## 2022-10-22 NOTE — CARE COORDINATION
Late entry:    [facility]. Spouse currently at bedside, states that her understanding is that he needs assist with all ADLs and has aide services 24/7 there. Walks with walker or if weak uses w/c. Has dementia. Will need ambulance transport to return. Noted ER nurse states that \"Pt arrives to the ED from independent living [place] a [place] for aggressive behavior. Pt was to be sent to [facility] for eval d/t increased aggressive behavior, poor nutritional intake, and inability to care for himself\". Charge nurse on unit searched chart and cannot find evidence of signed 72 hr hold. Pt last documented as aggressive and needing haldol 0751 today. Left vm with Nursing Supervisor and Director of the [facility] for information with no return call. Spouse states that she was called by [facility] and informed they had sent him to the hospital for aggression.

## 2022-10-22 NOTE — PLAN OF CARE
Problem: Skin/Tissue Integrity  Goal: Absence of new skin breakdown  Description: 1. Monitor for areas of redness and/or skin breakdown  2. Assess vascular access sites hourly  3. Every 4-6 hours minimum:  Change oxygen saturation probe site  4. Every 4-6 hours:  If on nasal continuous positive airway pressure, respiratory therapy assess nares and determine need for appliance change or resting period. Outcome: Progressing     Problem: Discharge Planning  Goal: Discharge to home or other facility with appropriate resources  Outcome: Progressing     Problem: Safety - Adult  Goal: Free from fall injury  Outcome: Progressing     Problem: ABCDS Injury Assessment  Goal: Absence of physical injury  Outcome: Progressing     Problem: Confusion  Goal: Confusion, delirium, dementia, or psychosis is improved or at baseline  Description: INTERVENTIONS:  1. Assess for possible contributors to thought disturbance, including medications, impaired vision or hearing, underlying metabolic abnormalities, dehydration, psychiatric diagnoses, and notify attending LIP  2. [place] high risk fall precautions, as indicated  3. Provide frequent short contacts to provide reality reorientation, refocusing and direction  4. Decrease environmental stimuli, including noise as appropriate  5. Monitor and intervene to maintain adequate nutrition, hydration, elimination, sleep and activity  6. If unable to ensure safety without constant attention obtain sitter and review sitter guidelines with assigned personnel  7.  Initiate Psychosocial CNS and Spiritual Care consult, as indicated  Outcome: Progressing

## 2022-10-22 NOTE — PLAN OF CARE
Patient noted to not have good urine output for the shift. Bladder scan showed >406ml. Notified MD. Bandar Davidson to straight cath. Patient straight cathed for 450ml clear yellow urine. Resistance was met at the prostate. Small amount blood noted when catheter was withdrawn.

## 2022-10-22 NOTE — PROGRESS NOTES
Pt refused echocardiogram. Educated pt on the importance of the test. Pt is non redirectable, aggressive. @4359, day shift RN updated.

## 2022-10-22 NOTE — CONSULTS
Consult placed    [facility], [address] [place] [address]  Date:10/22/2022,  Time:7:59 AM        Electronically signed by Yolanda Harvey on 10/22/2022 at 7:59 AM

## 2022-10-23 PROCEDURE — 6370000000 HC RX 637 (ALT 250 FOR IP): Performed by: INTERNAL MEDICINE

## 2022-10-23 PROCEDURE — 6360000002 HC RX W HCPCS: Performed by: INTERNAL MEDICINE

## 2022-10-23 PROCEDURE — 51798 US URINE CAPACITY MEASURE: CPT

## 2022-10-23 PROCEDURE — 2580000003 HC RX 258: Performed by: INTERNAL MEDICINE

## 2022-10-23 PROCEDURE — 1200000000 HC SEMI PRIVATE

## 2022-10-23 PROCEDURE — 2500000003 HC RX 250 WO HCPCS: Performed by: NURSE PRACTITIONER

## 2022-10-23 RX ORDER — OLANZAPINE 10 MG/1
10 INJECTION, POWDER, LYOPHILIZED, FOR SOLUTION INTRAMUSCULAR ONCE
Status: COMPLETED | OUTPATIENT
Start: 2022-10-23 | End: 2022-10-23

## 2022-10-23 RX ADMIN — METFORMIN HYDROCHLORIDE 500 MG: 500 TABLET ORAL at 18:00

## 2022-10-23 RX ADMIN — CETIRIZINE HYDROCHLORIDE 10 MG: 10 TABLET, FILM COATED ORAL at 08:49

## 2022-10-23 RX ADMIN — METHOCARBAMOL TABLETS 500 MG: 500 TABLET, COATED ORAL at 18:00

## 2022-10-23 RX ADMIN — SODIUM CHLORIDE, PRESERVATIVE FREE 10 ML: 5 INJECTION INTRAVENOUS at 20:18

## 2022-10-23 RX ADMIN — OLANZAPINE 10 MG: 10 INJECTION, POWDER, FOR SOLUTION INTRAMUSCULAR at 22:07

## 2022-10-23 RX ADMIN — PSYLLIUM HUSK 1 PACKET: 3.4 POWDER ORAL at 08:50

## 2022-10-23 RX ADMIN — ATORVASTATIN CALCIUM 10 MG: 10 TABLET, FILM COATED ORAL at 20:17

## 2022-10-23 RX ADMIN — METFORMIN HYDROCHLORIDE 500 MG: 500 TABLET ORAL at 08:49

## 2022-10-23 RX ADMIN — METHOCARBAMOL TABLETS 500 MG: 500 TABLET, COATED ORAL at 12:53

## 2022-10-23 RX ADMIN — SODIUM CHLORIDE, PRESERVATIVE FREE 10 ML: 5 INJECTION INTRAVENOUS at 08:50

## 2022-10-23 RX ADMIN — QUETIAPINE FUMARATE 50 MG: 50 TABLET, EXTENDED RELEASE ORAL at 20:17

## 2022-10-23 RX ADMIN — CYANOCOBALAMIN TAB 500 MCG 250 MCG: 500 TAB at 08:49

## 2022-10-23 RX ADMIN — ASPIRIN 81 MG 81 MG: 81 TABLET ORAL at 08:50

## 2022-10-23 RX ADMIN — METHOCARBAMOL TABLETS 500 MG: 500 TABLET, COATED ORAL at 20:17

## 2022-10-23 RX ADMIN — SERTRALINE HYDROCHLORIDE 50 MG: 50 TABLET ORAL at 20:17

## 2022-10-23 RX ADMIN — ENOXAPARIN SODIUM 40 MG: 100 INJECTION SUBCUTANEOUS at 08:50

## 2022-10-23 RX ADMIN — METHOCARBAMOL TABLETS 500 MG: 500 TABLET, COATED ORAL at 08:50

## 2022-10-23 ASSESSMENT — PAIN SCALES - GENERAL: PAINLEVEL_OUTOF10: 0

## 2022-10-23 NOTE — PROGRESS NOTES
Hospitalist Progress Note      PCP: Julio Cooper MD    Date of Admission: 10/21/2022    Chief Complaint: Confusion, atrial fibrillation    Hospital Course: Patient was being transferred to inpatient rehab from memory care unit for agitation. Noted to have atrial fibrillation on route and brought to [facility] instead. Patient does not have rapid ventricular rate. Was evaluated by cardiology and nothing further was recommended. Patient's mental status remains the same with no changes. Was cooperative at the time I saw him    Subjective: No chest pain, no shortness of breath, no nausea, no vomiting. No new issues      Medications:  Reviewed    Infusion Medications    sodium chloride       Scheduled Medications    aspirin  81 mg Oral Daily    psyllium  1 packet Oral Daily    QUEtiapine  50 mg Oral Nightly    sertraline  50 mg Oral Daily    vitamin B-12  250 mcg Oral Daily    methocarbamol  500 mg Oral 4x Daily    cetirizine  10 mg Oral Daily    atorvastatin  10 mg Oral Nightly    metFORMIN  500 mg Oral BID WC    sodium chloride flush  5-40 mL IntraVENous 2 times per day    enoxaparin  40 mg SubCUTAneous Daily     PRN Meds: haloperidol lactate, sodium chloride flush, sodium chloride, ondansetron **OR** ondansetron, polyethylene glycol, acetaminophen **OR** acetaminophen, perflutren lipid microspheres      Intake/Output Summary (Last 24 hours) at 10/23/2022 1240  Last data filed at 10/22/2022 1843  Gross per 24 hour   Intake --   Output 450 ml   Net -450 ml         Physical Exam Performed:    /74   Pulse 99   Temp 98.4 °F (36.9 °C) (Axillary)   Resp 17   Ht 6' 2\" (1.88 m)   Wt 166 lb 7.2 oz (75.5 kg)   SpO2 96%   BMI 21.37 kg/m²     General appearance: No apparent distress, appears stated age and cooperative. HEENT: Pupils equal, round, and reactive to light. Conjunctivae/corneas clear. Neck: Supple, with full range of motion. No jugular venous distention.  Trachea midline. Respiratory:  Normal respiratory effort. Clear to auscultation, bilaterally without Rales/Wheezes/Rhonchi. Cardiovascular: Irregularly irregular rhythm with normal S1/S2 without murmurs, rubs or gallops. Abdomen: Soft, non-tender, non-distended with normal bowel sounds. Musculoskeletal: No clubbing, cyanosis or edema bilaterally. Full range of motion without deformity. Skin: Skin color, texture, turgor normal.  No rashes or lesions. Neurologic:  Neurovascularly intact without any focal sensory/motor deficits. Cranial nerves: II-XII intact, grossly non-focal.  Psychiatric: Alert and confused. Not agitated  Capillary Refill: Brisk, 3 seconds, normal   Peripheral Pulses: +2 palpable, equal bilaterally     I examined the patient today (10/23/22). Physical exam is not significantly different compared to yesterday (10/22). Labs:   Recent Labs     10/21/22  1951   WBC 8.9   HGB 12.7*   HCT 37.6*          Recent Labs     10/21/22  2303   *   K 4.9      CO2 27   BUN 29*   CREATININE 0.8   CALCIUM 9.2       Recent Labs     10/21/22  2303   AST 31   ALT 29   BILITOT 0.4   ALKPHOS 134*       Recent Labs     10/22/22  0544   INR 1.00       Recent Labs     10/21/22  2303 10/22/22  0034 10/22/22  0544   TROPONINI <0.01 <0.01 <0.01         Urinalysis:      Lab Results   Component Value Date/Time    NITRU Negative 10/21/2022 09:35 PM    WBCUA 6-9 10/21/2022 09:35 PM    BACTERIA 1+ 10/21/2022 09:35 PM    RBCUA 5-10 10/21/2022 09:35 PM    BLOODU TRACE-INTACT 10/21/2022 09:35 PM    SPECGRAV 1.015 10/21/2022 09:35 PM    GLUCOSEU Negative 10/21/2022 09:35 PM       Radiology:  CT HEAD WO CONTRAST   Final Result   1. 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 These are   favored to be old, but that is not certain. 2. No acute intracranial findings elsewhere. 3. Volume loss and chronic microvascular ischemic changes.                  Assessment/Plan:    Active Hospital Problems Diagnosis     Dementia with aggressive behavior [Q70.371]      Priority: Medium    New onset a-fib ([facility] [I48.91]      Priority: Medium    Hyperlipidemia [E78.5]     Prostate cancer ([facility] Bryanna Ortiz     DM (diabetes mellitus), type 2 ([facility] [E11.9]      PLAN:    Atrial fibrillation  Rate controlled. Not a candidate for therapeutic anticoagulation. Has refused echocardiogram.  Nothing further from this standpoint. No new issues overnight. Agitation with dementia  Patient has dementia with well-known aggressive behavior with agitation. Remains stable today. Continue to monitor. Agitation seems to be an episodic issue. Dyslipidemia  Patient has been on statin. Continue same. No change    Diabetes mellitus type 2  Diet controlled. Monitor only. Discussed with nursing    DVT Prophylaxis: Lovenox  Diet: ADULT DIET; Regular  ADULT ORAL NUTRITION SUPPLEMENT; Breakfast, Lunch, Dinner; Standard High Calorie/High Protein Oral Supplement  Code Status: Full Code  PT/OT Eval Status: Ordered    Dispo -inpatient stay pending stabilization. Probably discharge tomorrow if stable. There is a note in the chart about patient's being put on psych hold when he was sent out of [facility]. However, case management has not been able to track down the original of this form.     Appropriate for A1 Discharge Unit: Eduarda Ramirez MD

## 2022-10-23 NOTE — PLAN OF CARE
Problem: Skin/Tissue Integrity  Goal: Absence of new skin breakdown  Description: 1. Monitor for areas of redness and/or skin breakdown  2. Assess vascular access sites hourly  3. Every 4-6 hours minimum:  Change oxygen saturation probe site  4. Every 4-6 hours:  If on nasal continuous positive airway pressure, respiratory therapy assess nares and determine need for appliance change or resting period. Outcome: Progressing     Problem: Discharge Planning  Goal: Discharge to home or other facility with appropriate resources  Outcome: Progressing     Problem: Safety - Adult  Goal: Free from fall injury  Outcome: Progressing     Problem: ABCDS Injury Assessment  Goal: Absence of physical injury  Outcome: Progressing     Problem: Confusion  Goal: Confusion, delirium, dementia, or psychosis is improved or at baseline  Description: INTERVENTIONS:  1. Assess for possible contributors to thought disturbance, including medications, impaired vision or hearing, underlying metabolic abnormalities, dehydration, psychiatric diagnoses, and notify attending LIP  2. [place] high risk fall precautions, as indicated  3. Provide frequent short contacts to provide reality reorientation, refocusing and direction  4. Decrease environmental stimuli, including noise as appropriate  5. Monitor and intervene to maintain adequate nutrition, hydration, elimination, sleep and activity  6. If unable to ensure safety without constant attention obtain sitter and review sitter guidelines with assigned personnel  7.  Initiate Psychosocial CNS and Spiritual Care consult, as indicated  Outcome: Progressing     Problem: Chronic Conditions and Co-morbidities  Goal: Patient's chronic conditions and co-morbidity symptoms are monitored and maintained or improved  Outcome: Progressing     Problem: Nutrition Deficit:  Goal: Optimize nutritional status  Outcome: Progressing     Problem: Pain  Goal: Verbalizes/displays adequate comfort level or baseline comfort level  Outcome: Progressing

## 2022-10-24 LAB
LV EF: 63 %
LVEF MODALITY: NORMAL

## 2022-10-24 PROCEDURE — 97166 OT EVAL MOD COMPLEX 45 MIN: CPT

## 2022-10-24 PROCEDURE — 97162 PT EVAL MOD COMPLEX 30 MIN: CPT

## 2022-10-24 PROCEDURE — 6370000000 HC RX 637 (ALT 250 FOR IP): Performed by: INTERNAL MEDICINE

## 2022-10-24 PROCEDURE — 2580000003 HC RX 258: Performed by: INTERNAL MEDICINE

## 2022-10-24 PROCEDURE — 93306 TTE W/DOPPLER COMPLETE: CPT

## 2022-10-24 PROCEDURE — 2500000003 HC RX 250 WO HCPCS: Performed by: NURSE PRACTITIONER

## 2022-10-24 PROCEDURE — 97535 SELF CARE MNGMENT TRAINING: CPT

## 2022-10-24 PROCEDURE — 6360000002 HC RX W HCPCS: Performed by: INTERNAL MEDICINE

## 2022-10-24 PROCEDURE — 1200000000 HC SEMI PRIVATE

## 2022-10-24 PROCEDURE — 97530 THERAPEUTIC ACTIVITIES: CPT

## 2022-10-24 PROCEDURE — 97110 THERAPEUTIC EXERCISES: CPT

## 2022-10-24 RX ORDER — OLANZAPINE 10 MG/1
10 INJECTION, POWDER, LYOPHILIZED, FOR SOLUTION INTRAMUSCULAR ONCE
Status: COMPLETED | OUTPATIENT
Start: 2022-10-24 | End: 2022-10-24

## 2022-10-24 RX ADMIN — METFORMIN HYDROCHLORIDE 500 MG: 500 TABLET ORAL at 09:18

## 2022-10-24 RX ADMIN — METHOCARBAMOL TABLETS 500 MG: 500 TABLET, COATED ORAL at 09:17

## 2022-10-24 RX ADMIN — SERTRALINE HYDROCHLORIDE 50 MG: 50 TABLET ORAL at 22:14

## 2022-10-24 RX ADMIN — ENOXAPARIN SODIUM 40 MG: 100 INJECTION SUBCUTANEOUS at 09:17

## 2022-10-24 RX ADMIN — SODIUM CHLORIDE, PRESERVATIVE FREE 10 ML: 5 INJECTION INTRAVENOUS at 22:14

## 2022-10-24 RX ADMIN — PSYLLIUM HUSK 1 PACKET: 3.4 POWDER ORAL at 09:18

## 2022-10-24 RX ADMIN — SODIUM CHLORIDE, PRESERVATIVE FREE 10 ML: 5 INJECTION INTRAVENOUS at 09:19

## 2022-10-24 RX ADMIN — METFORMIN HYDROCHLORIDE 500 MG: 500 TABLET ORAL at 18:01

## 2022-10-24 RX ADMIN — ASPIRIN 81 MG 81 MG: 81 TABLET ORAL at 09:17

## 2022-10-24 RX ADMIN — METHOCARBAMOL TABLETS 500 MG: 500 TABLET, COATED ORAL at 18:01

## 2022-10-24 RX ADMIN — OLANZAPINE 10 MG: 10 INJECTION, POWDER, FOR SOLUTION INTRAMUSCULAR at 22:09

## 2022-10-24 RX ADMIN — ATORVASTATIN CALCIUM 10 MG: 10 TABLET, FILM COATED ORAL at 22:09

## 2022-10-24 RX ADMIN — CETIRIZINE HYDROCHLORIDE 10 MG: 10 TABLET, FILM COATED ORAL at 09:17

## 2022-10-24 RX ADMIN — METHOCARBAMOL TABLETS 500 MG: 500 TABLET, COATED ORAL at 22:09

## 2022-10-24 RX ADMIN — CYANOCOBALAMIN TAB 500 MCG 250 MCG: 500 TAB at 09:18

## 2022-10-24 RX ADMIN — METHOCARBAMOL TABLETS 500 MG: 500 TABLET, COATED ORAL at 13:11

## 2022-10-24 RX ADMIN — QUETIAPINE FUMARATE 50 MG: 50 TABLET, EXTENDED RELEASE ORAL at 22:09

## 2022-10-24 NOTE — CARE COORDINATION
CASE MANAGEMENT INITIAL ASSESSMENT      Reviewed chart and completed assessment with patient:bedside  Family present: yes  Explained Case Management role/services. Primary contact information:Chantelle Hummel/wife    Health Care Decision Maker :   Primary Decision Maker: Karri Police - Spouse - [phone]          Can this person be reached and be able to respond quickly, such as within a few minutes or hours? Yes    Admit date/status:10/21/22  Diagnosis:AFIB   Is this a Readmission?:  No      Insurance:[employer] Medicare   Precert required for SNF: Yes       3 night stay required: No    Living arrangements, Adls, care needs, prior to admission:pt from [facility] [address] [place] [address]. Per wife, pt has not been ambulatory in more than a week. Has been incontinent    Durable Medical Equipment at home:  Walker_x_Cane__RTS__ BSC__Shower Chair__  02__ HHN__ CPAP__  BiPap__  Hospital Bed__ W/C__x_ Other_____    Services in the home and/or outpatient, prior to admission:per facility    Current Allen Cooper MD                                Medications: Prescription coverage? Yes Will pt require financial assistance with medications No     Transportation needs: will need ambulance transport     PT/OT recs:needed    [address] Notification (HEN):needed for SNF    Barriers to discharge:dementia/behaviors    Plan/comments:AFIB, dementia. Management per IM. Pt from [facility] [address] [place] [address]. Pt's wife does not want him to return. States pt is incontinent and believes that he is not being changed often enough. States that pt has developed an open area on his sacrum since being there. Also concerned that he is not being assisted with meals. CM has placed several referrals for SNF/LTC. CM following.  Jamaica Phillip, RN      [facility] on chart for MD signature

## 2022-10-24 NOTE — PROGRESS NOTES
Physical Therapy  Facility/Department: [facility] B3 - MED SURG  Physical Therapy Initial Assessment/Treatment    Name: Netta Booth  : 1940  MRN: [id number]  Date of Service: 10/24/2022    Discharge Recommendations:  [address], [address] with PT   PT Equipment Recommendations  Equipment Needed: No      Patient Diagnosis(es): The primary encounter diagnosis was Aggressive behavior. Diagnoses of New onset atrial fibrillation ([address] and Cerebrovascular accident (CVA), unspecified mechanism ([address] were also pertinent to this visit. Past Medical History:  has a past medical history of Arthritis, Cancer ([facility] Dementia ([facility] Diabetes mellitus ([facility] Edema, Hyperlipidemia, Joint pain, Neuropathy, Osteoarthritis, and Spinal stenosis. Past Surgical History:  has a past surgical history that includes Prostatectomy (); eye surgery; Cataract removal; and joint replacement (Right, 13). Assessment   Body Structures, Functions, Activity Limitations Requiring Skilled Therapeutic Intervention: Decreased functional mobility ; Decreased endurance;Decreased cognition;Decreased sensation;Decreased balance;Decreased strength;Decreased safe awareness  Assessment: Pt presents to [facility] with new onset A-fib. Pt from The [address] [place] [address]. Wife provides PLOF due to pt with poor level of alertness and states he is typically able to ambulate with RW, but has been using w/c more of recent. Pt currently difficult to arouse, but when he does he is able to answer simple questions and follow commands with increased time and repeition. Pt would benefit from continued skilled PT to address current deficits.  Recommend SNF vs LTC with PT upon d/c  Treatment Diagnosis: impaired functional mobility  Therapy Prognosis: Fair  Decision Making: Medium Complexity  Requires PT Follow-Up: Yes  Activity Tolerance  Activity Tolerance: Treatment limited secondary to decreased cognition;Patient tolerated evaluation without incident     Plan   Physcial Therapy Plan  General Plan: 2-3 times per week  Current Treatment Recommendations: Strengthening, Balance training, Functional mobility training, Transfer training, Endurance training, Neuromuscular re-education, Pain management, Home exercise program, Safety education & training, Patient/Caregiver education & training, Therapeutic activities, Equipment evaluation, education, & procurement  Safety Devices  Type of Devices: All fall risk precautions in place, Call light within reach, Bed alarm in place, Patient at risk for falls, Left in bed, Nurse notified  Restraints  Restraints Initially in Place: No     Restrictions  Restrictions/Precautions  Restrictions/Precautions: Fall Risk, Up as Tolerated, General Precautions     Subjective   General  Chart Reviewed: Yes  Patient assessed for rehabilitation services?: Yes  Family / Caregiver Present: Yes (Wife and sister in law)  Referring Practitioner: Tj Vance MD  Referral Date : 10/24/22  Diagnosis: New onset A-fib  Follows Commands: Impaired  General Comment  Comments: RN cleared pt for PT eval  Subjective  Subjective: Pt semi-supine in bed upon arrival with RN and family at bedside. Pt asleep and difficult to arouse. Pt eventually able to open eyes and answer some yes/no questions.          Social/Functional History  Social/Functional History  Type of Home: Assisted living ([facility])  Home Equipment: Walker, rolling  Has the patient had two or more falls in the past year or any fall with injury in the past year?: Yes (wife states Daryl Meza has had a lot of falls\", does not quantify)  Receives Help From: Other (comment) (staff at the [facility])  ADL Assistance: Needs assistance (wife reports pt has been needing assistance w/ all ADLs, reports pt hasn't been eating \"because they aren't feeding him\")  Toileting: Needs assistance (pt reported to have been incontinent of bowels/bladder recently)  Ambulation Assistance: Needs assistance (pt amb w/ RW at baseline, w/c when fatigued. wife reports pt has been non-ambulatory for the past week)  Transfer Assistance: Independent (IND at baseline, increased assist req recently)  Active : No       Cognition   Orientation  Overall Orientation Status: Impaired  Orientation Level: Unable to assess  Cognition  Overall Cognitive Status: Exceptions  Arousal/Alertness: Delayed responses to stimuli;Inconsistent responses to stimuli  Attention Span: Attends with cues to redirect; Unable to maintain attention  Insights: Not aware of deficits  Initiation: Requires cues for all  Sequencing: Requires cues for all  Cognition Comment: dementia at baseline     Objective   Heart Rate: 85  Heart Rate Source: Monitor  BP: (!) 98/58  BP Location: Right upper arm  BP Method: Automatic  Patient Position: Semi fowlers  MAP (Calculated): 71.33  Resp: 18  SpO2: 97 %  O2 Device: None (Room air)        Gross Assessment  AROM: Grossly decreased, non-functional  PROM: Generally decreased, functional  Strength: Grossly decreased, non-functional  Coordination: Grossly decreased, non-functional  Tone: Abnormal     Bed Mobility Training  Bed Mobility Training: Yes  Rolling: Maximum assistance;Assist X2;Additional time (use of BR, max VCs for hand placement)  Balance  Sitting:  (LINNEA)  Standing:  (LINNEA)  Transfer Training  Transfer Training: No (OOB activity limited 2/2 MARCELLE)      AM-PAC Score  AM-PAC Inpatient Mobility Raw Score : 7 (10/24/22 1620)  AM-PAC Inpatient T-Scale Score : 26.42 (10/24/22 1620)  Mobility Inpatient CMS 0-100% Score: 92.36 (10/24/22 1620)  Mobility Inpatient CMS G-Code Modifier : CM (10/24/22 1620)    Goals  Short Term Goals  Time Frame for Short Term Goals: 7 days (10/31/22) unless otherwise noted  Short Term Goal 1: Pt will perform supine <> sit with mod(A)x2  Short Term Goal 2: Pt will perform transfer bed to chair with mod(A)x2  Short Term Goal 3: Pt will tolerate sitting EOB x7 minutes with min(A)  Short Term Goal 4: Pt will perform 10-12 reps AAROM by 10/27  Patient Goals   Patient Goals : family goal \"to go to rehab\"       Education  Patient Education  Education Given To: Patient  Education Provided: Role of Therapy;Plan of Care;Transfer Training  Education Method: Verbal  Barriers to Learning: Cognition; Hearing  Education Outcome: Continued education needed      Therapy Time   Individual Concurrent Group Co-treatment   Time In 1310         Time Out 1343         Minutes 33         Timed Code Treatment Minutes: 23 Minutes (10 min eval)     If pt is unable to be seen after this session, please let this note serve as discharge summary. Please see case management note for discharge disposition. Thank you.     Abdirizak Rodrigues, PT

## 2022-10-24 NOTE — CARE COORDINATION
CM spoke with pt's wife and son at bedside. Family verbalized concerns over lack of care received at facility. Would like referral to [facility]. Referral placed, Mary Castillo will review chart and insurance. CM will follow. Jamaica Calderon RN     Addendum 11:42  [facility] unable to accept due to behaviors. CM notified pt's wife. Will need to discuss other facilities. Jamaica Calderon RN     Addendum 13:28  Referral to [facility], unable to accept skilled. Could take LTC. CM d/w wife. Wife prefers not to send pt to [facility]. Referral sent to [facility] with [employer]. Family requested referral to [facility]. Referral placed. Shelia will review.  Jamaica Calderon RN

## 2022-10-24 NOTE — PROGRESS NOTES
Occupational Therapy  Facility/Department: [facility] B3 - MED SURG  Occupational Therapy Initial Assessment & Treatment    Name: Hubert Stallings  : 1940  MRN: [id number]  Date of Service: 10/24/2022    Discharge Recommendations:  [address], [address] without OT  OT Equipment Recommendations  Other: Defer to facility to obtain     Patient Diagnosis(es): The primary encounter diagnosis was Aggressive behavior. Diagnoses of New onset atrial fibrillation ([address] and Cerebrovascular accident (CVA), unspecified mechanism ([address] were also pertinent to this visit. Past Medical History:  has a past medical history of Arthritis, Cancer ([address] Dementia ([address] Diabetes mellitus ([facility] Edema, Hyperlipidemia, Joint pain, Neuropathy, Osteoarthritis, and Spinal stenosis. Past Surgical History:  has a past surgical history that includes Prostatectomy (); eye surgery; Cataract removal; and joint replacement (Right, 13). Assessment   Performance deficits / Impairments: Decreased functional mobility ; Decreased ADL status; Decreased ROM; Decreased strength;Decreased safe awareness;Decreased cognition;Decreased endurance;Decreased balance;Decreased high-level IADLs;Decreased coordination;Decreased posture  Assessment: Pt is an 79 yo M who presented to [facility] for A fib, initially sent to ED for aggressive behavior. Pt lives at the [facility] and req A for ADLs at baseline, able to amb w/ RW short distances and w/c for long distances or when especially fatigued. Pt w/ h/o dementia and normally well-behaved at facility. Upon eval, pt difficult to arouse and req max cues for arousal and participation. Pt req maxA for feeding w/ max tactile and verbal cues for oral phases of swallowing. Pt w/ decreased safety awareness during feeding w/ episode of apiration while sipping iced tea through straw. Pt noted to have increased tone in BUE and BLEs, PROM WFL.  Pt req maxAx2 for rolling in bed, OOB deffered this date 2/2 MARCELLE. Pt's wife and STEVE were present for evaluation, reported pt has declined severly over past several days, endorsing dissatisfaction w/ current ECF and that pt hasn't been eating \"because he doesn't like the girls and that's why he got upset\" and that he has not been OOB in ~1 week. Pt is presenting below his PLOF and would benefit from inpatient OT services, pending increased arousal levels. Rec SNF vs LTC facility pending increased participation in therapy to maximize pt's functional status and safety w/ daily occupations.   Prognosis: Good  Decision Making: Medium Complexity  REQUIRES OT FOLLOW-UP: Yes  Activity Tolerance  Activity Tolerance: Patient limited by fatigue;Treatment limited secondary to decreased cognition        AM-PAC Daily Activity Inpatient   How much help for putting on and taking off regular lower body clothing?: Total  How much help for Bathing?: Total  How much help for Toileting?: Total  How much help for putting on and taking off regular upper body clothing?: A Lot  How much help for taking care of personal grooming?: A Lot  How much help for eating meals?: A Lot  AM-[facility] Inpatient Daily Activity Raw Score: 9  AM-PAC Inpatient ADL T-Scale Score : 25.33  ADL Inpatient CMS 0-100% Score: 79.59  ADL Inpatient CMS G-Code Modifier : CL     Plan   Occupational Therapy Plan  Times Per Week: 2-3x/wk  Current Treatment Recommendations: Strengthening, Balance training, Functional mobility training, Endurance training, Safety education & training, Self-Care / ADL     Restrictions  Restrictions/Precautions  Restrictions/Precautions: Fall Risk, Up as Tolerated, General Precautions    Subjective   General  Chart Reviewed: Yes, Progress Notes, History and Physical  Patient assessed for rehabilitation services?: Yes  Subjective  Subjective: pt asleep in bed upon arrival, wife and STEVE present  Pain: pt shouted in pain when washing around L eye, did not rate or formally describe     Social/Functional History  Social/Functional History  Type of Home: Assisted living ([facility])  Home Equipment: Walker, rolling  Has the patient had two or more falls in the past year or any fall with injury in the past year?: Yes (wife states Jefferson Francisco has had a lot of falls\", does not quantify)  Receives Help From: Other (comment) (staff at the [facility])  ADL Assistance: Needs assistance (wife reports pt has been needing assistance w/ all ADLs, reports pt hasn't been eating \"because they aren't feeding him\")  Toileting: Needs assistance (pt reported to have been incontinent of bowels/bladder recently)  Ambulation Assistance: Needs assistance (pt amb w/ RW at baseline, w/c when fatigued. wife reports pt has been non-ambulatory for the past week)  Transfer Assistance: Independent (IND at baseline, increased assist req recently)  Active : No     Objective   Heart Rate: 85  Heart Rate Source: Monitor  BP: 107/61  BP Location: Right upper arm  BP Method: Automatic  Patient Position: Semi fowlers  MAP (Calculated): 76.33  Resp: 18  SpO2: 98 %  O2 Device: None (Room air)       Observation/Palpation  Observation: bruise noted over L eye, tender to the touch  Scar: pt observed to have scars/scabs covering BLEs; wife reports are from falls  Safety Devices  Type of Devices: All fall risk precautions in place;Call light within reach; Bed alarm in place; Patient at risk for falls; Left in bed;Nurse notified  Restraints  Restraints Initially in Place: No    Bed Mobility Training  Bed Mobility Training: Yes  Rolling: Maximum assistance;Assist X2;Additional time (use of BR, max VCs for hand placement)  Balance  Sitting:  (LINNEA)  Standing:  (LINNEA)  Transfer Training  Transfer Training: No (OOB activity limited 2/2 MARCELLE)     AROM: Generally decreased, functional  PROM: Generally decreased, functional  Strength: Generally decreased, functional  Coordination: Generally decreased, functional  Tone: Abnormal (pt noted to have increased tone in BUE/BLEs)    ADL  Feeding: Maximum assistance;Verbal cueing;Setup;Bringing food to mouth assist;Increased time to complete;Scoop assist;Beverage management (seated in bed)  Feeding Skilled Clinical Factors: max cues for swallowing/oral phases of swallowing, decreased safety awareness during swallowing/increased aspiration risk  Grooming: Dependent/Total (seated in bed)  Grooming Skilled Clinical Factors: wash face, cues for eye opening      Cognition  Overall Cognitive Status: Exceptions  Arousal/Alertness: Delayed responses to stimuli;Inconsistent responses to stimuli  Following Commands:  (follows one step commands ~25% of the time)  Attention Span: Attends with cues to redirect; Unable to maintain attention  Memory:  (LINNEA)  Insights: Not aware of deficits  Initiation: Requires cues for all  Sequencing: Requires cues for all  Cognition Comment: dementia at baseline  Orientation  Overall Orientation Status: Impaired  Orientation Level: Unable to assess (2/2 MARCELLE)      Education Given To: Family; Patient  Education Provided: Role of Therapy;Plan of Care;Precautions; Family Education  Education Method: Demonstration;Verbal  Barriers to Learning: Cognition  Education Outcome: Unable to verbalize; Unable to demonstrate understanding;Continued education needed (family demo's and verbalizes education)    Disease Specific Education: Pt educated on importance of OOB mobility, prevention of complications of bedrest, and general safety during hospitalization.  Pt unable to verbalize or demo understanding, pt's wife and STEVE verbalized understanding      Goals  Short Term Goals  Time Frame for Short Term Goals: 1 week (10/31) unless otherwise specified  Short Term Goal 1: Pt will complete bed mob w/ modA and LRAD  Short Term Goal 2: Pt will complete STS transfer to LRAD w/ Jeremiah (10/27)  Short Term Goal 3: Pt will complete LB dressing w/ Jeremiah and LRAD  Short Term Goal 4: Pt will complete standing level grooming task w/ Jeremiah and min cues for initiation  Short Term Goal 5: Pt will complete x10 BUE therex to increase UE ROM and strength  Patient Goals   Patient goals : pt unable to state     Therapy Time   Individual Concurrent Group Co-treatment   Time In 1310         Time Out 1343         Minutes 33         Timed Code Treatment Minutes: 23 Minutes (10 min eval)     If pt is unable to be seen after this session, please let this note serve as discharge summary. Please see case management note for discharge disposition. Thank you.      Robyn Moyer OTR/L

## 2022-10-24 NOTE — PLAN OF CARE
thought disturbance (confusion, delirium, dementia, or psychosis) are managed with adequate functional status: Assess for contributors to thought disturbance, including medications, impaired vision or hearing, underlying metabolic abnormalities, dehydration, psychiatric diagnoses, notify LIP     Problem: Chronic Conditions and Co-morbidities  Goal: Patient's chronic conditions and co-morbidity symptoms are monitored and maintained or improved  Outcome: Progressing  Flowsheets (Taken 10/24/2022 0923)  Care Plan - Patient's Chronic Conditions and Co-Morbidity Symptoms are Monitored and Maintained or Improved: Monitor and assess patient's chronic conditions and comorbid symptoms for stability, deterioration, or improvement     Problem: Nutrition Deficit:  Goal: Optimize nutritional status  Outcome: Progressing

## 2022-10-24 NOTE — PROGRESS NOTES
Hospitalist Progress Note      PCP: Anila Vazquez MD    Date of Admission: 10/21/2022    Chief Complaint: confusion, aggression    Hospital Course:   78 y.o. male who presented to [facility] with above complaints  Patient with PMH of dementia, DM2, HTN, HLD, prostate CA was sent from [facility] for aggressive behavior. Patient has history of dementia and is mostly well behaved but since yesterday he has been very aggressive with staff. They gave him Haldol yesterday but he continued to have episodes of aggressive behavior today so they sent him to the ED. patient himself is very demented to endorse any complaints at this time however he does deny any chest pain or palpitations. In the ED patient was found to have new onset A. fib rate controlled on the EKG. Subjective: continues to be confused with intermittent aggression. Easily redirectable. Medications:  Reviewed    Infusion Medications    sodium chloride       Scheduled Medications    aspirin  81 mg Oral Daily    psyllium  1 packet Oral Daily    QUEtiapine  50 mg Oral Nightly    sertraline  50 mg Oral Daily    vitamin B-12  250 mcg Oral Daily    methocarbamol  500 mg Oral 4x Daily    cetirizine  10 mg Oral Daily    atorvastatin  10 mg Oral Nightly    metFORMIN  500 mg Oral BID WC    sodium chloride flush  5-40 mL IntraVENous 2 times per day    enoxaparin  40 mg SubCUTAneous Daily     PRN Meds: haloperidol lactate, sodium chloride flush, sodium chloride, ondansetron **OR** ondansetron, polyethylene glycol, acetaminophen **OR** acetaminophen, perflutren lipid microspheres    No intake or output data in the 24 hours ending 10/24/22 1525    Physical Exam Performed:    /61   Pulse 85   Temp 98.1 °F (36.7 °C) (Axillary)   Resp 18   Ht 6' 2\" (1.88 m)   Wt 166 lb 7.2 oz (75.5 kg)   SpO2 98%   BMI 21.37 kg/m²     General appearance:  No apparent distress, appears stated age and cooperative.   HEENT:  Normal cephalic,  without obvious deformity. Pupils equal, round, and reactive to light. Extra ocular muscles intact. Conjunctivae/corneas clear. Ecchymosis around the left eye with bruising  Neck: Supple, with full range of motion. No jugular venous distention. Trachea midline. Respiratory:  Normal respiratory effort. Clear to auscultation, bilaterally without Rales/Wheezes/Rhonchi. Cardiovascular: Irregularly irregular rhythm with normal V6/B6 with loud systolic murmur along LSB, no rubs or gallops. Abdomen: Soft, non-tender, non-distended with normal bowel sounds. Musculoskeletal:  No clubbing, cyanosis or edema bilaterally. Full range of motion without deformity. Skin: Skin color, texture, turgor normal.  No rashes or lesions. Neurologic: Alert and oriented to self, does not know place or time. Neurovascularly intact without any focal sensory/motor deficits. Cranial nerves: II-XII intact, grossly non-focal.  Psychiatric:  Alert but disoriented  Capillary Refill: Brisk,3 seconds, normal  Peripheral Pulses: +2 palpable, equal bilaterally       Labs:   Recent Labs     10/21/22  1951   WBC 8.9   HGB 12.7*   HCT 37.6*        Recent Labs     10/21/22  2303   *   K 4.9      CO2 27   BUN 29*   CREATININE 0.8   CALCIUM 9.2     Recent Labs     10/21/22  2303   AST 31   ALT 29   BILITOT 0.4   ALKPHOS 134*     Recent Labs     10/22/22  0544   INR 1.00     Recent Labs     10/21/22  2303 10/22/22  0034 10/22/22  0544   TROPONINI <0.01 <0.01 <0.01       Urinalysis:      Lab Results   Component Value Date/Time    NITRU Negative 10/21/2022 09:35 PM    WBCUA 6-9 10/21/2022 09:35 PM    BACTERIA 1+ 10/21/2022 09:35 PM    RBCUA 5-10 10/21/2022 09:35 PM    BLOODU TRACE-INTACT 10/21/2022 09:35 PM    SPECGRAV 1.015 10/21/2022 09:35 PM    GLUCOSEU Negative 10/21/2022 09:35 PM       Radiology:  CT HEAD WO CONTRAST   Final Result   1. 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   These are   favored to be old, but that is not certain. 2. No acute intracranial findings elsewhere. 3. Volume loss and chronic microvascular ischemic changes. Assessment/Plan:    Active Hospital Problems    Diagnosis     Dementia with aggressive behavior [S83.826]      Priority: Medium    New onset a-fib ([facility] [I48.91]      Priority: Medium    Hyperlipidemia [E78.5]     Prostate cancer ([facility] Vibha Solanoglen     DM (diabetes mellitus), type 2 ([facility] [E11.9]      Persistent Afib  - rate controlled  - echo with EF 60-65%  - not on AC prior to admission    Dementia with behavioral disturbance  - chronic intermittent agitation  - continue seroquel, zoloft  - will likely need longterm placement    DMII  - well controlled  - continue metformin    HLD  - continue statin    DVT Prophylaxis: lovenox  Diet: ADULT DIET;  Regular  ADULT ORAL NUTRITION SUPPLEMENT; Breakfast, Lunch, Dinner; Standard High Calorie/High Protein Oral Supplement  Code Status: Full Code  PT/OT Eval Status: ordered    Dispo - SNF pending per-cert    Appropriate for A1 Discharge Unit: No      Reina Bunch MD

## 2022-10-25 PROCEDURE — 6360000002 HC RX W HCPCS: Performed by: INTERNAL MEDICINE

## 2022-10-25 PROCEDURE — 6360000002 HC RX W HCPCS: Performed by: NURSE PRACTITIONER

## 2022-10-25 PROCEDURE — 2580000003 HC RX 258: Performed by: INTERNAL MEDICINE

## 2022-10-25 PROCEDURE — 1200000000 HC SEMI PRIVATE

## 2022-10-25 PROCEDURE — 6370000000 HC RX 637 (ALT 250 FOR IP): Performed by: INTERNAL MEDICINE

## 2022-10-25 RX ORDER — ZIPRASIDONE MESYLATE 20 MG/ML
10 INJECTION, POWDER, LYOPHILIZED, FOR SOLUTION INTRAMUSCULAR ONCE
Status: COMPLETED | OUTPATIENT
Start: 2022-10-25 | End: 2022-10-25

## 2022-10-25 RX ORDER — ZIPRASIDONE MESYLATE 20 MG/ML
10 INJECTION, POWDER, LYOPHILIZED, FOR SOLUTION INTRAMUSCULAR ONCE
Status: DISCONTINUED | OUTPATIENT
Start: 2022-10-25 | End: 2022-10-25

## 2022-10-25 RX ADMIN — QUETIAPINE FUMARATE 50 MG: 50 TABLET, EXTENDED RELEASE ORAL at 20:30

## 2022-10-25 RX ADMIN — ASPIRIN 81 MG 81 MG: 81 TABLET ORAL at 14:10

## 2022-10-25 RX ADMIN — HALOPERIDOL LACTATE 5 MG: 5 INJECTION, SOLUTION INTRAMUSCULAR at 20:30

## 2022-10-25 RX ADMIN — SERTRALINE HYDROCHLORIDE 50 MG: 50 TABLET ORAL at 20:30

## 2022-10-25 RX ADMIN — ACETAMINOPHEN 650 MG: 325 TABLET ORAL at 20:30

## 2022-10-25 RX ADMIN — ENOXAPARIN SODIUM 40 MG: 100 INJECTION SUBCUTANEOUS at 14:09

## 2022-10-25 RX ADMIN — CETIRIZINE HYDROCHLORIDE 10 MG: 10 TABLET, FILM COATED ORAL at 14:10

## 2022-10-25 RX ADMIN — ZIPRASIDONE MESYLATE 10 MG: 20 INJECTION, POWDER, LYOPHILIZED, FOR SOLUTION INTRAMUSCULAR at 02:08

## 2022-10-25 RX ADMIN — HALOPERIDOL LACTATE 5 MG: 5 INJECTION, SOLUTION INTRAMUSCULAR at 14:09

## 2022-10-25 RX ADMIN — ATORVASTATIN CALCIUM 10 MG: 10 TABLET, FILM COATED ORAL at 20:30

## 2022-10-25 RX ADMIN — SODIUM CHLORIDE, PRESERVATIVE FREE 10 ML: 5 INJECTION INTRAVENOUS at 14:09

## 2022-10-25 RX ADMIN — CYANOCOBALAMIN TAB 500 MCG 250 MCG: 500 TAB at 14:10

## 2022-10-25 RX ADMIN — METFORMIN HYDROCHLORIDE 500 MG: 500 TABLET ORAL at 14:10

## 2022-10-25 RX ADMIN — SODIUM CHLORIDE, PRESERVATIVE FREE 10 ML: 5 INJECTION INTRAVENOUS at 20:38

## 2022-10-25 RX ADMIN — METHOCARBAMOL TABLETS 500 MG: 500 TABLET, COATED ORAL at 20:30

## 2022-10-25 RX ADMIN — METHOCARBAMOL TABLETS 500 MG: 500 TABLET, COATED ORAL at 14:10

## 2022-10-25 ASSESSMENT — PAIN SCALES - WONG BAKER
WONGBAKER_NUMERICALRESPONSE: 0

## 2022-10-25 ASSESSMENT — PAIN SCALES - GENERAL: PAINLEVEL_OUTOF10: 0

## 2022-10-25 NOTE — PLAN OF CARE
Problem: Skin/Tissue Integrity  Goal: Absence of new skin breakdown  Description: 1. Monitor for areas of redness and/or skin breakdown  2. Assess vascular access sites hourly  3. Every 4-6 hours minimum:  Change oxygen saturation probe site  4. Every 4-6 hours:  If on nasal continuous positive airway pressure, respiratory therapy assess nares and determine need for appliance change or resting period. Outcome: Progressing     Problem: Discharge Planning  Goal: Discharge to home or other facility with appropriate resources  Outcome: Progressing     Problem: Safety - Adult  Goal: Free from fall injury  Outcome: Progressing     Problem: ABCDS Injury Assessment  Goal: Absence of physical injury  Outcome: Progressing     Problem: Confusion  Goal: Confusion, delirium, dementia, or psychosis is improved or at baseline  Description: INTERVENTIONS:  1. Assess for possible contributors to thought disturbance, including medications, impaired vision or hearing, underlying metabolic abnormalities, dehydration, psychiatric diagnoses, and notify attending LIP  2. [place] high risk fall precautions, as indicated  3. Provide frequent short contacts to provide reality reorientation, refocusing and direction  4. Decrease environmental stimuli, including noise as appropriate  5. Monitor and intervene to maintain adequate nutrition, hydration, elimination, sleep and activity  6. If unable to ensure safety without constant attention obtain sitter and review sitter guidelines with assigned personnel  7.  Initiate Psychosocial CNS and Spiritual Care consult, as indicated  Outcome: Progressing  Flowsheets (Taken 10/25/2022 0800)  Effect of thought disturbance (confusion, delirium, dementia, or psychosis) are managed with adequate functional status: Assess for contributors to thought disturbance, including medications, impaired vision or hearing, underlying metabolic abnormalities, dehydration, psychiatric diagnoses, notify LIP Problem: Chronic Conditions and Co-morbidities  Goal: Patient's chronic conditions and co-morbidity symptoms are monitored and maintained or improved  Outcome: Progressing     Problem: Nutrition Deficit:  Goal: Optimize nutritional status  Outcome: Progressing     Problem: Pain  Goal: Verbalizes/displays adequate comfort level or baseline comfort level  Outcome: Progressing

## 2022-10-25 NOTE — CARE COORDINATION
CM spoke with Shelia from [facility], unable to accept pt due to aggressive behaviors. CM called Angella from [facility]. She will reach out to pt's wife, Florina Campoverde. CM continues to follow. Jamaica Patricio RN CM spoke with [facility] with [facility]. [facility] spoke with pt's wife. List of 5 different communities given for wife to review. Angella discussed behaviors, changes, facilities and price differences. CM will fax requested information to [facility] for review.  Jamaica Patricio RN

## 2022-10-25 NOTE — PROGRESS NOTES
Hospitalist Progress Note      PCP: Simón Smith MD    Date of Admission: 10/21/2022    Chief Complaint: confusion, aggression    Hospital Course:   78 y.o. male who presented to [facility] with above complaints  Patient with PMH of dementia, DM2, HTN, HLD, prostate CA was sent from [facility] for aggressive behavior. Patient has history of dementia and is mostly well behaved but since yesterday he has been very aggressive with staff. They gave him Haldol yesterday but he continued to have episodes of aggressive behavior today so they sent him to the ED. patient himself is very demented to endorse any complaints at this time however he does deny any chest pain or palpitations. In the ED patient was found to have new onset A. fib rate controlled on the EKG. Subjective: continues to be confused with intermittent aggression. Easily redirectable.        Medications:  Reviewed    Infusion Medications    sodium chloride       Scheduled Medications    aspirin  81 mg Oral Daily    psyllium  1 packet Oral Daily    QUEtiapine  50 mg Oral Nightly    sertraline  50 mg Oral Daily    vitamin B-12  250 mcg Oral Daily    methocarbamol  500 mg Oral 4x Daily    cetirizine  10 mg Oral Daily    atorvastatin  10 mg Oral Nightly    metFORMIN  500 mg Oral BID WC    sodium chloride flush  5-40 mL IntraVENous 2 times per day    enoxaparin  40 mg SubCUTAneous Daily     PRN Meds: haloperidol lactate, sodium chloride flush, sodium chloride, ondansetron **OR** ondansetron, polyethylene glycol, acetaminophen **OR** acetaminophen, perflutren lipid microspheres      Intake/Output Summary (Last 24 hours) at 10/25/2022 1500  Last data filed at 10/25/2022 1428  Gross per 24 hour   Intake 0 ml   Output --   Net 0 ml       Physical Exam Performed:    /76   Pulse 77   Temp 97.6 °F (36.4 °C) (Oral)   Resp 16   Ht 6' 2\" (1.88 m)   Wt 166 lb 7.2 oz (75.5 kg)   SpO2 97%   BMI 21.37 kg/m²     General appearance:  No apparent distress, appears stated age and cooperative. HEENT:  Normal cephalic,  without obvious deformity. Pupils equal, round, and reactive to light. Extra ocular muscles intact. Conjunctivae/corneas clear. Ecchymosis around the left eye with bruising  Neck: Supple, with full range of motion. No jugular venous distention. Trachea midline. Respiratory:  Normal respiratory effort. Clear to auscultation, bilaterally without Rales/Wheezes/Rhonchi. Cardiovascular: Irregularly irregular rhythm with normal R0/K0 with loud systolic murmur along LSB, no rubs or gallops. Abdomen: Soft, non-tender, non-distended with normal bowel sounds. Musculoskeletal:  No clubbing, cyanosis or edema bilaterally. Full range of motion without deformity. Skin: Skin color, texture, turgor normal.  No rashes or lesions. Neurologic: Alert and oriented to self, does not know place or time. Neurovascularly intact without any focal sensory/motor deficits. Cranial nerves: II-XII intact, grossly non-focal.  Psychiatric:  Alert but disoriented  Capillary Refill: Brisk,3 seconds, normal  Peripheral Pulses: +2 palpable, equal bilaterally       Labs:   No results for input(s): WBC, HGB, HCT, PLT in the last 72 hours. No results for input(s): NA, K, CL, CO2, BUN, CREATININE, CALCIUM, PHOS in the last 72 hours. Invalid input(s): MAGNES    No results for input(s): AST, ALT, BILIDIR, BILITOT, ALKPHOS in the last 72 hours. No results for input(s): INR in the last 72 hours. No results for input(s): Bret Omer in the last 72 hours. Urinalysis:      Lab Results   Component Value Date/Time    NITRU Negative 10/21/2022 09:35 PM    WBCUA 6-9 10/21/2022 09:35 PM    BACTERIA 1+ 10/21/2022 09:35 PM    RBCUA 5-10 10/21/2022 09:35 PM    BLOODU TRACE-INTACT 10/21/2022 09:35 PM    SPECGRAV 1.015 10/21/2022 09:35 PM    GLUCOSEU Negative 10/21/2022 09:35 PM       Radiology:  CT HEAD WO CONTRAST   Final Result   1.  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 These are   favored to be old, but that is not certain. 2. No acute intracranial findings elsewhere. 3. Volume loss and chronic microvascular ischemic changes. Assessment/Plan:    Active Hospital Problems    Diagnosis     Dementia with aggressive behavior [F61.038]      Priority: Medium    New onset a-fib ([facility] [I48.91]      Priority: Medium    Hyperlipidemia [E78.5]     Prostate cancer ([facility] Samara Avelina     DM (diabetes mellitus), type 2 ([facility] [E11.9]      Persistent Afib  - rate controlled  - echo with EF 60-65%  - not on AC prior to admission    Dementia with behavioral disturbance  - chronic intermittent agitation  - continue seroquel, zoloft  - will likely need longterm placement  - possible enrollment in hospice following discharge    DMII  - well controlled  - continue metformin    HLD  - continue statin    DVT Prophylaxis: lovenox  Diet: ADULT DIET; Regular  ADULT ORAL NUTRITION SUPPLEMENT; Breakfast, Lunch, Dinner; Standard High Calorie/High Protein Oral Supplement  Code Status: Full Code  PT/OT Eval Status: ordered    Dispo - SNF vs LTC placement. No accepting facility thus far.  Medically ready for discharge    Appropriate for A1 Discharge Unit: No      Lady Van MD

## 2022-10-25 NOTE — PROGRESS NOTES
Patient awake and yelling out, trying to get out of bed. Impulsive, haldol injection given per order.  See mar

## 2022-10-26 PROCEDURE — 6370000000 HC RX 637 (ALT 250 FOR IP): Performed by: INTERNAL MEDICINE

## 2022-10-26 PROCEDURE — 6370000000 HC RX 637 (ALT 250 FOR IP): Performed by: PSYCHIATRY & NEUROLOGY

## 2022-10-26 PROCEDURE — 1200000000 HC SEMI PRIVATE

## 2022-10-26 PROCEDURE — 6360000002 HC RX W HCPCS: Performed by: INTERNAL MEDICINE

## 2022-10-26 PROCEDURE — 97530 THERAPEUTIC ACTIVITIES: CPT

## 2022-10-26 PROCEDURE — 97535 SELF CARE MNGMENT TRAINING: CPT

## 2022-10-26 PROCEDURE — 2580000003 HC RX 258: Performed by: INTERNAL MEDICINE

## 2022-10-26 RX ORDER — OXYCODONE HYDROCHLORIDE AND ACETAMINOPHEN 5; 325 MG/1; MG/1
1 TABLET ORAL EVERY 4 HOURS PRN
Status: DISCONTINUED | OUTPATIENT
Start: 2022-10-26 | End: 2022-10-28 | Stop reason: HOSPADM

## 2022-10-26 RX ORDER — QUETIAPINE FUMARATE 25 MG/1
25 TABLET, FILM COATED ORAL 2 TIMES DAILY
Status: DISCONTINUED | OUTPATIENT
Start: 2022-10-26 | End: 2022-10-28 | Stop reason: HOSPADM

## 2022-10-26 RX ADMIN — ATORVASTATIN CALCIUM 10 MG: 10 TABLET, FILM COATED ORAL at 19:50

## 2022-10-26 RX ADMIN — QUETIAPINE FUMARATE 25 MG: 25 TABLET ORAL at 17:40

## 2022-10-26 RX ADMIN — OXYCODONE AND ACETAMINOPHEN 1 TABLET: 5; 325 TABLET ORAL at 12:20

## 2022-10-26 RX ADMIN — CYANOCOBALAMIN TAB 500 MCG 250 MCG: 500 TAB at 08:32

## 2022-10-26 RX ADMIN — ENOXAPARIN SODIUM 40 MG: 100 INJECTION SUBCUTANEOUS at 08:31

## 2022-10-26 RX ADMIN — CETIRIZINE HYDROCHLORIDE 10 MG: 10 TABLET, FILM COATED ORAL at 08:32

## 2022-10-26 RX ADMIN — QUETIAPINE FUMARATE 25 MG: 25 TABLET ORAL at 11:23

## 2022-10-26 RX ADMIN — METHOCARBAMOL TABLETS 500 MG: 500 TABLET, COATED ORAL at 17:42

## 2022-10-26 RX ADMIN — METHOCARBAMOL TABLETS 500 MG: 500 TABLET, COATED ORAL at 19:50

## 2022-10-26 RX ADMIN — METFORMIN HYDROCHLORIDE 500 MG: 500 TABLET ORAL at 08:32

## 2022-10-26 RX ADMIN — QUETIAPINE FUMARATE 50 MG: 50 TABLET, EXTENDED RELEASE ORAL at 19:50

## 2022-10-26 RX ADMIN — METHOCARBAMOL TABLETS 500 MG: 500 TABLET, COATED ORAL at 08:33

## 2022-10-26 RX ADMIN — ASPIRIN 81 MG 81 MG: 81 TABLET ORAL at 08:32

## 2022-10-26 RX ADMIN — OXYCODONE AND ACETAMINOPHEN 1 TABLET: 5; 325 TABLET ORAL at 19:50

## 2022-10-26 RX ADMIN — METHOCARBAMOL TABLETS 500 MG: 500 TABLET, COATED ORAL at 12:20

## 2022-10-26 RX ADMIN — PSYLLIUM HUSK 1 PACKET: 3.4 POWDER ORAL at 08:31

## 2022-10-26 RX ADMIN — SODIUM CHLORIDE, PRESERVATIVE FREE 10 ML: 5 INJECTION INTRAVENOUS at 08:34

## 2022-10-26 RX ADMIN — SODIUM CHLORIDE, PRESERVATIVE FREE 10 ML: 5 INJECTION INTRAVENOUS at 19:53

## 2022-10-26 RX ADMIN — METFORMIN HYDROCHLORIDE 500 MG: 500 TABLET ORAL at 17:40

## 2022-10-26 ASSESSMENT — PAIN SCALES - WONG BAKER
WONGBAKER_NUMERICALRESPONSE: 0
WONGBAKER_NUMERICALRESPONSE: 2

## 2022-10-26 ASSESSMENT — PAIN SCALES - GENERAL: PAINLEVEL_OUTOF10: 8

## 2022-10-26 ASSESSMENT — PAIN DESCRIPTION - LOCATION: LOCATION: LEG

## 2022-10-26 NOTE — CONSULTS
[facility]                 Shahana Larson                                   CONSULTATION    PATIENT NAME: Marla Sender                :        1940  MED REC NO:   [id number]                          ROOM:       7307  ACCOUNT NO:   [de-identified]                           ADMIT DATE: 10/21/2022  PROVIDER:     Ines Villanueva MD    CONSULT DATE:  10/26/2022    PSYCHIATRY CONSULTATION    REQUESTING PROVIDER:  Benjamin Delaney MD    HISTORY OF PRESENT ILLNESS:  The patient is an 77-year-old who was  referred for admission by the [facility], which is a dementia unit after  the patient's behavior had become more aggressive and difficult to  manage. The chart reports that the family was upset because they found  the patient had not been adequately care for, there was sitting in his  own stool and possibly had stopped taking medication and he was admitted  for further workup. Here he has been calling out agitated and  Psychiatry was asked to evaluate. The patient has dementia at baseline and is not an appropriate  historian. However, as I walk into the room he tells me to be careful  and look behind me because he is hallucinating and he thinks people are  coming to get him. He verbalizes his intent to leave the hospital  although physically does not seem to be capable of that and is currently  taking Seroquel XR 50 mg at bedtime and recently had gotten p.r.n.  because of his behavior. Typically the patient might be verbally  aggressive and it was felt that he was not physically aggressive  although the staff report that when they go to change his _____ because  he is so raw perianally, he does sometimes _____ and it takes two people  to safely change him. PAST PSYCHIATRIC HISTORY:  Seems to receive some psychiatric medication  while at the [facility], I do not believe he was on those before.   There is  no documentation of any history of psychiatric inpatient treatment or  any history of suicide attempts. FAMILY PSYCHIATRIC HISTORY:  Unknown and the patient is not able to  provide that information. SOCIAL HISTORY:  The patient states he has worked in various jobs  throughout his life and he has always been dizzy. He denies drug or  alcohol dependence and again it is not believed that the patient is  really a reliable self historian. He clearly has family involved. REVIEW OF SYSTEMS:  A 10-system review was attempted, but other than  complaining of pain perianally he does not really offer any other  complaints. PHYSICAL EXAMINATION:  VITAL SIGNS:  His temperature was 98.7, his pulse was 82, respirations  were 18. His blood pressure was 128/78. GENERAL APPEARANCE:  The patient appears to be of stated age. He is  agitated. He is not in restraints, however. NEUROMUSCULAR EXAM:  The patient appears to be generally weak, but of  normal muscle tone throughout. His gait, the patient was too weak to  ambulate safely. MENTAL STATUS EXAM:  The patient appears as indicated above. There is  no abnormal movements, tics or mannerisms and nothing that would suggest  tardive dyskinesia. His thought processes appear to be actively  hallucinating and paranoid that he is not safe and that somebody is  coming to \"get me. \"  His mood is labile and agitated, but there is no  evidence of depression. He is not tearful. His speech is tangential  and circumstantial.  His language is without aphasia. He is alert to  person only. His memory could not really be tested given his current  cognitive situation, but he clearly has dementia at baseline. His  general fund of knowledge appears adequate. His insight and judgment  are very limited. DIAGNOSES:  AXIS I:  1. Psychosis - unspecified. 2.  Dementia with behavioral disturbances. AXIS II:  No diagnosis. AXIS III:  1. Atrial fibrillation. 2.  Prostate cancer. 3.  Diabetes type 2.   AXIS IV: Severe. AXIS V:  Current GAF 25 to 30, highest in the past year unclear. RECOMMENDATIONS:  1. The patient is clearly hallucinating and becoming agitated around  that. They started Seroquel XR 50 mg presumably at the [facility] and he  is also taking Seroquel 50 mg a day although it is not clear what that  is accomplishing given his overall presentation. I do not know that the  Zoloft may have precipitated some of this, so I am going to discontinue  it. I will add some Seroquel during the day to see if we can reduce his  agitation and the hallucinations and paranoia that are currently feeding  into his agitation and watch for signs of lethargy or over medication. He has also complained quite a bit of a perianal pain and the nurses  have documented how raw the area has done there. Dr. Donnie Whyte, I spoke  that things may be transitioning to a comfort care model and if that is  the case I think treating the pain more aggressively may be helpful as  well. I do not see at this point given the transition to hospice kind  of services that Geropsychiatry would hold much for the patient. I will  follow while he is hospitalized and please call if there is any  question. 2.  More than 70 minutes was spent on the consultation, more than half  of which was in direct patient care and included care coordination and  treatment planning.         Maxwell Blackwell MD    D: 10/26/2022 11:43:40       T: 10/26/2022 11:49:08     [email or web address]  Job#: [id number]     Doc#: [id number]    CC:

## 2022-10-26 NOTE — CARE COORDINATION
Chart reviewed. Confusion with aggressive behavior. Sent from [facility] AL/dementia unit due to aggressive behaviors. Family refusing to send pt back to [facility] due to concerns over [place] of care\". They report that he was left in soiled briefs and that he was not being encouraged or assisted to eat. Family reports that pt has developed open area on his sacrum since being at [facility]. Due to pt's aggressive behaviors, CM has been having difficulty getting pt placed in another facility. Referrals have been made to the following facilities. [facility] [place]unable to accept d/t behaviors  [facility] -unable to accept skilled, could accept LTC. Would not be family's preference. [facility] -unable to accept d/t behaviors    10/25/22   Referral sent to [facility] with [facility]. [facility] is taking pt's wife to tour facilities tomorrow. Referral faxed to Kayla Wilson at [facility] on 10/25/22  -suggesting that pt have psych consult, possibly minor-psych admission for medication adjustment, as pt has been requiring PRN Haldol this day. CM d/w Attending, unsure that is the best course for this pt at this time. 10/26/22   Received voice mail from [employer]. Plans to tour facilities w/pt's wife today. States that minor-psych would be an option for this pt, since he has a good discharge plan after potential medication adjustments. Per her d/w wife, pt has only been on Seroquel for 2 weeks, perhaps his dose needs to be adjusted. Suggesting either [facility] or [facility] minor-psych units. CM will d/w Attending again today during MD jama. [facility] also requested that CM fax referral to [employer] darius Burris. Referral faxed. Jamaica Bear, RN    Addendum 13:46  Updated notes faxed to [facility] [phone] and [employer] [facility]. [phone]  Addendum 14:00  Referral information emailed to Kayla Wilson at [facility]. [email or web address]

## 2022-10-26 NOTE — PROGRESS NOTES
Hospitalist Progress Note      PCP: Troy Durate MD    Date of Admission: 10/21/2022    Chief Complaint: confusion, aggression    Hospital Course:   78 y.o. male who presented to [facility] with above complaints  Patient with PMH of dementia, DM2, HTN, HLD, prostate CA was sent from [facility] for aggressive behavior. Patient has history of dementia and is mostly well behaved but since yesterday he has been very aggressive with staff. They gave him Haldol yesterday but he continued to have episodes of aggressive behavior today so they sent him to the ED. patient himself is very demented to endorse any complaints at this time however he does deny any chest pain or palpitations. In the ED patient was found to have new onset A. fib rate controlled on the EKG. Subjective: continues to be confused with intermittent aggression. Easily redirectable.        Medications:  Reviewed    Infusion Medications    sodium chloride       Scheduled Medications    QUEtiapine  25 mg Oral BID    aspirin  81 mg Oral Daily    psyllium  1 packet Oral Daily    QUEtiapine  50 mg Oral Nightly    vitamin B-12  250 mcg Oral Daily    methocarbamol  500 mg Oral 4x Daily    cetirizine  10 mg Oral Daily    atorvastatin  10 mg Oral Nightly    metFORMIN  500 mg Oral BID WC    sodium chloride flush  5-40 mL IntraVENous 2 times per day    enoxaparin  40 mg SubCUTAneous Daily     PRN Meds: oxyCODONE-acetaminophen, haloperidol lactate, sodium chloride flush, sodium chloride, ondansetron **OR** ondansetron, polyethylene glycol, acetaminophen **OR** acetaminophen, perflutren lipid microspheres      Intake/Output Summary (Last 24 hours) at 10/26/2022 1133  Last data filed at 10/26/2022 0951  Gross per 24 hour   Intake 170 ml   Output --   Net 170 ml       Physical Exam Performed:    /64   Pulse (!) 48   Temp 97.6 °F (36.4 °C) (Oral)   Resp 16   Ht 6' 2\" (1.88 m)   Wt 166 lb 7.2 oz (75.5 kg)   SpO2 97%   BMI 21.37 kg/m² General appearance:  No apparent distress, appears stated age and cooperative. HEENT:  Normal cephalic,  without obvious deformity. Pupils equal, round, and reactive to light. Extra ocular muscles intact. Conjunctivae/corneas clear. Ecchymosis around the left eye with bruising  Neck: Supple, with full range of motion. No jugular venous distention. Trachea midline. Respiratory:  Normal respiratory effort. Clear to auscultation, bilaterally without Rales/Wheezes/Rhonchi. Cardiovascular: Irregularly irregular rhythm with normal P4/O8 with loud systolic murmur along LSB, no rubs or gallops. Abdomen: Soft, non-tender, non-distended with normal bowel sounds. Musculoskeletal:  No clubbing, cyanosis or edema bilaterally. Full range of motion without deformity. Skin: Skin color, texture, turgor normal.  No rashes or lesions. Neurologic: Alert and oriented to self, does not know place or time. Neurovascularly intact without any focal sensory/motor deficits. Cranial nerves: II-XII intact, grossly non-focal.  Psychiatric:  Alert but disoriented  Capillary Refill: Brisk,3 seconds, normal  Peripheral Pulses: +2 palpable, equal bilaterally       Labs:   No results for input(s): WBC, HGB, HCT, PLT in the last 72 hours. No results for input(s): NA, K, CL, CO2, BUN, CREATININE, CALCIUM, PHOS in the last 72 hours. Invalid input(s): MAGNES    No results for input(s): AST, ALT, BILIDIR, BILITOT, ALKPHOS in the last 72 hours. No results for input(s): INR in the last 72 hours. No results for input(s): Raghav Bigness in the last 72 hours.       Urinalysis:      Lab Results   Component Value Date/Time    NITRU Negative 10/21/2022 09:35 PM    WBCUA 6-9 10/21/2022 09:35 PM    BACTERIA 1+ 10/21/2022 09:35 PM    RBCUA 5-10 10/21/2022 09:35 PM    BLOODU TRACE-INTACT 10/21/2022 09:35 PM    SPECGRAV 1.015 10/21/2022 09:35 PM    GLUCOSEU Negative 10/21/2022 09:35 PM       Radiology:  CT HEAD WO CONTRAST   Final Result 1. 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 These are   favored to be old, but that is not certain. 2. No acute intracranial findings elsewhere. 3. Volume loss and chronic microvascular ischemic changes. Assessment/Plan:    Active Hospital Problems    Diagnosis     Dementia with aggressive behavior [V66.063]      Priority: Medium    [facility] ([facility] [I48.91]      Priority: Medium    Hyperlipidemia [E78.5]     Prostate cancer ([facility] Gustavo Quinn     DM (diabetes mellitus), type 2 ([facility] [E11.9]      Persistent Afib  - rate controlled  - echo with EF 60-65%  - not on AC prior to admission    Dementia with behavioral disturbance  - chronic intermittent agitation  - increased seroquel. continue zoloft  - will likely need longterm placement  - possible enrollment in hospice following discharge    DMII  - well controlled  - continue metformin    HLD  - continue statin    DVT Prophylaxis: lovenox  Diet: ADULT DIET; Regular  ADULT ORAL NUTRITION SUPPLEMENT; Breakfast, Lunch, Dinner; Standard High Calorie/High Protein Oral Supplement  Code Status: Full Code  PT/OT Eval Status: ordered    Dispo - SNF vs LTC placement. Adjusting psych meds today.  Still working to find accepting facility    Appropriate for A1 Discharge Unit: No      Damián Arnold MD

## 2022-10-26 NOTE — PLAN OF CARE
Problem: Skin/Tissue Integrity  Goal: Absence of new skin breakdown  Description: 1. Monitor for areas of redness and/or skin breakdown  2. Assess vascular access sites hourly  3. Every 4-6 hours minimum:  Change oxygen saturation probe site  4. Every 4-6 hours:  If on nasal continuous positive airway pressure, respiratory therapy assess nares and determine need for appliance change or resting period. Outcome: Progressing     Problem: Discharge Planning  Goal: Discharge to home or other facility with appropriate resources  Outcome: Progressing  Flowsheets (Taken 10/26/2022 4729)  Discharge to home or other facility with appropriate resources:   Identify barriers to discharge with patient and caregiver   Arrange for needed discharge resources and transportation as appropriate   Identify discharge learning needs (meds, wound care, etc)     Problem: Safety - Adult  Goal: Free from fall injury  Outcome: Progressing     Problem: ABCDS Injury Assessment  Goal: Absence of physical injury  Outcome: Progressing     Problem: Confusion  Goal: Confusion, delirium, dementia, or psychosis is improved or at baseline  Description: INTERVENTIONS:  1. Assess for possible contributors to thought disturbance, including medications, impaired vision or hearing, underlying metabolic abnormalities, dehydration, psychiatric diagnoses, and notify attending LIP  2. [place] high risk fall precautions, as indicated  3. Provide frequent short contacts to provide reality reorientation, refocusing and direction  4. Decrease environmental stimuli, including noise as appropriate  5. Monitor and intervene to maintain adequate nutrition, hydration, elimination, sleep and activity  6. If unable to ensure safety without constant attention obtain sitter and review sitter guidelines with assigned personnel  7.  Initiate Psychosocial CNS and Spiritual Care consult, as indicated  Outcome: Progressing  Flowsheets (Taken 10/26/2022 2746)  Effect of thought disturbance (confusion, delirium, dementia, or psychosis) are managed with adequate functional status: Assess for contributors to thought disturbance, including medications, impaired vision or hearing, underlying metabolic abnormalities, dehydration, psychiatric diagnoses, notify LIP     Problem: Chronic Conditions and Co-morbidities  Goal: Patient's chronic conditions and co-morbidity symptoms are monitored and maintained or improved  Outcome: Progressing  Flowsheets (Taken 10/26/2022 0842)  Care Plan - Patient's Chronic Conditions and Co-Morbidity Symptoms are Monitored and Maintained or Improved: Monitor and assess patient's chronic conditions and comorbid symptoms for stability, deterioration, or improvement     Problem: Nutrition Deficit:  Goal: Optimize nutritional status  Outcome: Progressing  Flowsheets (Taken 10/26/2022 1056 by Alexia Oppenheim)  Nutrient intake appropriate for improving, restoring, or maintaining nutritional needs:   Assess nutritional status and recommend course of action   Monitor oral intake, labs, and treatment plans   Recommend appropriate diets, oral nutritional supplements, and vitamin/mineral supplements     Problem: Pain  Goal: Verbalizes/displays adequate comfort level or baseline comfort level  Outcome: Progressing

## 2022-10-26 NOTE — PROGRESS NOTES
Occupational Therapy  Facility/Department: [facility] B3 - MED SURG  Occupational Therapy Daily Treatment Note     Name: Valerie East  : 1940  MRN: [id number]  Date of Service: 10/26/2022    Discharge Recommendations:  Subacute/Skilled Nursing Facility    Completed co-tx with PT to safely address ADLs and mobility d/t extent of performance deficits. Patient Diagnosis(es): The primary encounter diagnosis was Aggressive behavior. Diagnoses of New onset atrial fibrillation ([facility] and Cerebrovascular accident (CVA), unspecified mechanism ([address] were also pertinent to this visit. Past Medical History:  has a past medical history of Arthritis, Cancer ([facility] [facility] Dementia ([facility] [address] Diabetes mellitus ([facility] [address] Edema, Hyperlipidemia, Joint pain, Neuropathy, Osteoarthritis, and Spinal stenosis. Past Surgical History:  has a past surgical history that includes Prostatectomy (); eye surgery; Cataract removal; and joint replacement (Right, 13). Assessment   Performance deficits / Impairments: Decreased functional mobility ; Decreased ADL status; Decreased ROM; Decreased strength;Decreased safe awareness;Decreased cognition;Decreased endurance;Decreased balance;Decreased high-level IADLs;Decreased coordination;Decreased posture  Assessment: Pt participated in ADLs and bed mobility this session. Pt required max x2 for bed mobility and mod/max A for sitting balance EOB. Did not transfer OOB d/t poor sitting balance. He did sit EOB x15 min and took bites of lunch with max A. Pt was confused but redirectable. Followed simple directions with mod cues. Recommend SNF for skilled OT to improve function. Cont OT in acute care.   Prognosis: Fair  Activity Tolerance  Activity Tolerance: Treatment limited secondary to decreased cognition      Plan   Occupational Therapy Plan  Times Per Week: 2-3x/wk  Current Treatment Recommendations: Strengthening, Balance training, Functional mobility training, Endurance training, Safety education & training, Self-Care / ADL, Cognitive reorientation     Restrictions  Restrictions/Precautions  Restrictions/Precautions: Fall Risk, Up as Tolerated, General Precautions  Position Activity Restriction  Other position/activity restrictions: Avasys    Subjective   General  Chart Reviewed: Yes, Progress Notes, History and Physical  Patient assessed for rehabilitation services?: Yes  Additional Pertinent Hx: hx dementia with behaviors  Family / Caregiver Present: No  Referring Practitioner: Denia Pastor  Diagnosis: new onset afib  Subjective  Subjective: Pt agreeable to OT. No signs of pain. General Comment  Comments: RN approved therapy      Objective   Heart Rate: 81  Heart Rate Source: Monitor  BP: (!) 95/58  BP Location: Left upper arm  BP Method: Automatic  Patient Position: Semi fowlers  MAP (Calculated): 70.33  Resp: 20  SpO2: 95 %  O2 Device: None (Room air)           Safety Devices  Type of Devices: Left in bed;Call light within reach; Bed alarm in place; All fall risk precautions in place;Nurse notified;Telesitter in use; Heels elevated for pressure relief       ADL  Feeding: Verbal cueing;Setup; Increased time to complete;Scoop assist;Maximum assistance  Feeding Skilled Clinical Factors: Mod A for sitting balance as pt took some bites of lunch with spoon using L hand. Additional max A needed to take additional bites of food and drink from cup. Grooming: Maximum assistance;Verbal cueing; Increased time to complete  Grooming Skilled Clinical Factors: Wash face/hands and for hair. Total assist to clean ears. UE Dressing: Maximum assistance  LE Dressing: Dependent/Total  Toileting: Dependent/Total      Bed mobility  Supine to Sit: Maximum assistance;2 Person assistance  Sit to Supine: Maximum assistance;2 Person assistance  Scooting: Dependent/Total ([place] bed)  Transfers  Transfer Comments: Transfer not attempted d/t poor sitting balance and decreased command-following.  Pt required mod to max A for sitting balance d/t posterior lean. Cognition  Overall Cognitive Status: Exceptions  Arousal/Alertness: Delayed responses to stimuli  Following Commands: Follows one step commands with repetition; Follows one step commands with increased time  Attention Span: Difficulty attending to directions; Attends with cues to redirect  Memory: Decreased recall of recent events;Decreased recall of biographical Information;Decreased short term memory;Decreased long term memory  Safety Judgement: Decreased awareness of need for safety;Decreased awareness of need for assistance  Problem Solving: Assistance required to identify errors made;Assistance required to correct errors made  Insights: Not aware of deficits  Initiation: Requires cues for all  Sequencing: Requires cues for all  Cognition Comment: dementia at baseline  Orientation  Overall Orientation Status: Impaired  Orientation Level: Oriented to person;Disoriented to situation;Disoriented to time;Disoriented to place (Oriented to name, vc's for birthdate)         Education Given To: Patient  Education Provided: Role of Therapy;Orientation; ADL Adaptive Strategies  Education Method: Verbal  Barriers to Learning: Cognition  Education Outcome: Unable to demonstrate understanding;Continued education needed   Disease Specific Education: Pt educated on importance of OOB mobility, prevention of complications of bedrest, and general safety during hospitalization. Pt did not verbalize understanding. Additional education to be provided.   AM-PAC Score   AM-[facility] Inpatient Daily Activity Raw Score: 9 (10/26/22 1429)  AM-PAC Inpatient ADL T-Scale Score : 25.33 (10/26/22 1429)  ADL Inpatient CMS 0-100% Score: 79.59 (10/26/22 1429)  ADL Inpatient CMS G-Code Modifier : CL (10/26/22 1429)  Goals  Short Term Goals  Time Frame for Short Term Goals: 1 week (10/31) unless otherwise specified--ALL GOALS ONGOING 10/26/22  Short Term Goal 1: Pt will complete bed mob w/ modA and LRAD  Short Term Goal 2: Pt will complete STS transfer to LRAD w/ Jeremiah (10/27--extend to 10/29)  Short Term Goal 3: Pt will complete LB dressing w/ Jeremiah and LRAD  Short Term Goal 4: Pt will complete standing level grooming task w/ Jeremiah and min cues for initiation  Short Term Goal 5: Pt will complete x10 BUE therex to increase UE ROM and strength  Patient Goals   Patient goals : pt unable to state     Therapy Time   Individual Concurrent Group Co-treatment   Time In 1320         Time Out 1358         Minutes 38         Timed Code Treatment Minutes: 45 Minutes   If pt is discharged prior to next OT session, this note will serve as the discharge summary.   Betsy Soulier OT

## 2022-10-26 NOTE — CONSULTS
Full note dictated. Dx:  psychosis unspecified         Dementia with behavioral disturbances    Rec:  1). He is taking seroquel XR 50 at  but is actively hallucinating and paranoid now. I will add some seroquel during the day and am told the team may be moving to more comfort care . He is having scrotal/perirectal pain and suggest we treat that more aggressively. I don't see a role for in patient geropsych given move to Hospice kind of care.       Cipriano Yeh MD

## 2022-10-26 NOTE — PROGRESS NOTES
Comprehensive Nutrition Assessment    Type and Reason for Visit:  Reassess    Nutrition Recommendations/Plan:   Continue regular diet. Continue Ensure Enlive ONS TID. Encourage PO intakes and assist with meals. Monitor nutrition adequacy, pertinent labs, bowel habits, wt changes, and clinical progress. Malnutrition Assessment:  Malnutrition Status: At risk for malnutrition (Comment) (10/26/22 1109)    Context:  Chronic Illness     Findings of the 6 clinical characteristics of malnutrition:  Energy Intake:  75% or less estimated energy requirements for 1 month or longer    Nutrition Assessment:    Follow up: Pt remains nutritionally at risk on a regular diet AEB poor PO intakes between 0-25% recorded in EMR. Psychiatry consulted yesterday/following pt. Pt has dementia, episode yesterday of hallucination/paranoia with aggression. Pt is assisted with eating his meals, spoke with RN who reports pt ate 25-50% of breakfast today, no dinner last night. Ensure Enlive ordered TID last visit. RN reports pt drank 1 this morning. Only 1 supplement intake recorded in EMR of 51-75%. Continued to encourage PO intakes with RN. Will continue to monitor. Nutrition Related Findings:    Na 135. +1 BLE edema. BM 10/25. Wound Type: None       Current Nutrition Intake & Therapies:    Average Meal Intake: 0%, 1-25%, 51-75% (majority of recorded intakes between 0-25%)  Average Supplements Intake: 51-75% (x1)  ADULT DIET; Regular  ADULT ORAL NUTRITION SUPPLEMENT; Breakfast, Lunch, Dinner; Standard High Calorie/High Protein Oral Supplement    Anthropometric Measures:  Height: 6' 2\" (188 cm)  Ideal Body Weight (IBW): 190 lbs (86 kg)       Current Body Weight: 166 lb 7.2 oz (75.5 kg), 87.6 % IBW.  Weight Source: Not Specified (10/22/22)  Current BMI (kg/m2): 21.4  Usual Body Weight: 200 lb (90.7 kg) (6mo ago per pt's wife)  % Weight Change (Calculated): -18.5  Weight Adjustment For: No Adjustment                 BMI Categories: Underweight (BMI less than 22) age over 72    Estimated Daily Nutrient Needs:  Energy Requirements Based On: Kcal/kg (28-33)  Weight Used for Energy Requirements: Current  Energy (kcal/day): 2958-8007  Weight Used for Protein Requirements: Current (1-1.2)  Protein (g/day): 74-89  Method Used for Fluid Requirements: 1 ml/kcal  Fluid (ml/day): 6976-7973    Nutrition Diagnosis:   Inadequate oral intake related to inadequate protein-energy intake as evidenced by poor intake prior to admission, weight loss, intake 0-25%, intake 26-50%    Nutrition Interventions:   Food and/or Nutrient Delivery: Continue Current Diet, Continue Oral Nutrition Supplement  Nutrition Education/Counseling: No recommendation at this time  Coordination of Nutrition Care: Continue to monitor while inpatient       Goals:  Previous Goal Met: No Progress toward Goal(s)  Goals: PO intake 50% or greater, prior to discharge       Nutrition Monitoring and Evaluation:   Behavioral-Environmental Outcomes: None Identified  Food/Nutrient Intake Outcomes: Food and Nutrient Intake, Supplement Intake  Physical Signs/Symptoms Outcomes: Biochemical Data, Nutrition Focused Physical Findings, Weight    Discharge Planning:    Continue current diet, Continue Oral Nutrition Supplement     [address]: Office: Office: [phone]; [address]: [address]

## 2022-10-26 NOTE — CONSULTS
Dr. Makenna Tadeo present on the floor and is aware of the consult at [phone] on 10/26/2022   RN aware  Meena Myers

## 2022-10-26 NOTE — PROGRESS NOTES
Physical Therapy  Facility/Department: [facility] B3 - MED SURG  Daily Treatment Note  NAME: Diomedes Pacheco  : 1940  MRN: [id number]    Date of Service: 10/26/2022    Discharge Recommendations:  [address], [address] with PT   PT Equipment Recommendations  Equipment Needed: No    AM-PAC Mobility Inpatient   How much difficulty turning over in bed?: A Lot  How much difficulty sitting down on / standing up from a chair with arms?: Unable  How much difficulty moving from lying on back to sitting on side of bed?: A Lot  How much help from another person moving to and from a bed to a chair?: Total  How much help from another person needed to walk in hospital room?: Total  How much help from another person for climbing 3-5 steps with a railing?: Total  AM-PAC Inpatient Mobility Raw Score : 8  AM-PAC Inpatient T-Scale Score : 28.52  Mobility Inpatient CMS 0-100% Score: 86.62  Mobility Inpatient CMS G-Code Modifier : CM     Patient Diagnosis(es): The primary encounter diagnosis was Aggressive behavior. Diagnoses of New onset atrial fibrillation ([address] and Cerebrovascular accident (CVA), unspecified mechanism ([address] were also pertinent to this visit. Assessment   Assessment: Pt pleasantly confused and participated in PT needing redirection to task on a regular basis. pt sat EOB needing max A of 2 spine <> sit for 15- 20 minutes needing mod A decreased to min/CGA for balance while eating and performing ADL's. Pt is recommended for con't skilled PT and SNF at D/C. Activity Tolerance: Treatment limited secondary to decreased cognition;Patient tolerated evaluation without incident  Equipment Needed: No     Plan    Physcial Therapy Plan  General Plan: 2-3 times per week  Current Treatment Recommendations: Strengthening;Balance training;Functional mobility training;Transfer training; Endurance training;Neuromuscular re-education;Pain management;Home exercise program;Safety education & training;Patient/Caregiver education & training; Therapeutic activities; Equipment evaluation, education, & procurement     Restrictions  Restrictions/Precautions  Restrictions/Precautions: Fall Risk, Up as Tolerated, General Precautions  Position Activity Restriction  Other position/activity restrictions: Avasys     Subjective    Subjective  Subjective: Pt agreeable, confused  Orientation  Overall Orientation Status: Impaired  Orientation Level: Oriented to person;Disoriented to situation;Disoriented to time;Disoriented to place (Oriented to name, vc's for birthdate)  Cognition  Overall Cognitive Status: Exceptions  Arousal/Alertness: Delayed responses to stimuli  Following Commands: Follows one step commands with repetition; Follows one step commands with increased time  Attention Span: Difficulty attending to directions; Attends with cues to redirect  Memory: Decreased recall of recent events;Decreased recall of biographical Information;Decreased short term memory;Decreased long term memory  Safety Judgement: Decreased awareness of need for safety;Decreased awareness of need for assistance  Problem Solving: Assistance required to identify errors made;Assistance required to correct errors made  Insights: Not aware of deficits  Initiation: Requires cues for all  Sequencing: Requires cues for all  Cognition Comment: dementia at baseline     Objective   Vitals   Vitals:    10/26/22    BP: 95/58   Pulse: 81   Resp:    Temp:    SpO2: 95          Bed Mobility Training  Bed Mobility Training: Yes  Supine to Sit: Maximum assistance;Assist X2;Additional time (to L with HOB elevated)  Sit to Supine: Maximum assistance;Assist X2;Additional time  Duration: 15-20 minutes           Safety Devices  Type of Devices: Left in bed;Call light within reach; Bed alarm in place; All fall risk precautions in place;Nurse notified;Telesitter in use; Heels elevated for pressure relief  Restraints  Restraints Initially in Place: No Goals  Short Term Goals  Time Frame for Short Term Goals: 7 days (10/31/22) unless otherwise noted  Short Term Goal 1: Pt will perform supine <> sit with mod(A)x2  Short Term Goal 2: Pt will perform transfer bed to chair with mod(A)x2  Short Term Goal 3: Pt will tolerate sitting EOB x7 minutes with min(A)  Short Term Goal 4: Pt will perform 10-12 reps AAROM by 10/27  Patient Goals   Patient Goals : family goal \"to go to rehab\"    Education  Patient Education  Education Given To: Patient  Education Provided: Role of Therapy;Plan of Care;Transfer Training  Education Method: Verbal  Barriers to Learning: Cognition; Hearing  Education Outcome: Continued education needed    Therapy Time   Individual Concurrent Group Co-treatment   Time In 1320         Time Out 1358         Minutes [address], [address]

## 2022-10-27 LAB
EKG ATRIAL RATE: 79 BPM
EKG DIAGNOSIS: NORMAL
EKG P AXIS: 9 DEGREES
EKG P-R INTERVAL: 216 MS
EKG Q-T INTERVAL: 374 MS
EKG QRS DURATION: 98 MS
EKG QTC CALCULATION (BAZETT): 428 MS
EKG R AXIS: 63 DEGREES
EKG T AXIS: 43 DEGREES
EKG VENTRICULAR RATE: 79 BPM

## 2022-10-27 PROCEDURE — 93010 ELECTROCARDIOGRAM REPORT: CPT | Performed by: INTERNAL MEDICINE

## 2022-10-27 PROCEDURE — 6370000000 HC RX 637 (ALT 250 FOR IP): Performed by: INTERNAL MEDICINE

## 2022-10-27 PROCEDURE — 2580000003 HC RX 258: Performed by: INTERNAL MEDICINE

## 2022-10-27 PROCEDURE — 1200000000 HC SEMI PRIVATE

## 2022-10-27 PROCEDURE — 6360000002 HC RX W HCPCS: Performed by: INTERNAL MEDICINE

## 2022-10-27 PROCEDURE — 6370000000 HC RX 637 (ALT 250 FOR IP): Performed by: PSYCHIATRY & NEUROLOGY

## 2022-10-27 PROCEDURE — 93005 ELECTROCARDIOGRAM TRACING: CPT | Performed by: NURSE PRACTITIONER

## 2022-10-27 RX ADMIN — CETIRIZINE HYDROCHLORIDE 10 MG: 10 TABLET, FILM COATED ORAL at 08:38

## 2022-10-27 RX ADMIN — SODIUM CHLORIDE, PRESERVATIVE FREE 10 ML: 5 INJECTION INTRAVENOUS at 20:14

## 2022-10-27 RX ADMIN — METHOCARBAMOL TABLETS 500 MG: 500 TABLET, COATED ORAL at 20:13

## 2022-10-27 RX ADMIN — ENOXAPARIN SODIUM 40 MG: 100 INJECTION SUBCUTANEOUS at 08:38

## 2022-10-27 RX ADMIN — PSYLLIUM HUSK 1 PACKET: 3.4 POWDER ORAL at 08:39

## 2022-10-27 RX ADMIN — QUETIAPINE FUMARATE 50 MG: 50 TABLET, EXTENDED RELEASE ORAL at 20:13

## 2022-10-27 RX ADMIN — SODIUM CHLORIDE, PRESERVATIVE FREE 10 ML: 5 INJECTION INTRAVENOUS at 08:39

## 2022-10-27 RX ADMIN — METHOCARBAMOL TABLETS 500 MG: 500 TABLET, COATED ORAL at 17:30

## 2022-10-27 RX ADMIN — QUETIAPINE FUMARATE 25 MG: 25 TABLET ORAL at 17:30

## 2022-10-27 RX ADMIN — QUETIAPINE FUMARATE 25 MG: 25 TABLET ORAL at 08:39

## 2022-10-27 RX ADMIN — METFORMIN HYDROCHLORIDE 500 MG: 500 TABLET ORAL at 08:38

## 2022-10-27 RX ADMIN — ATORVASTATIN CALCIUM 10 MG: 10 TABLET, FILM COATED ORAL at 20:13

## 2022-10-27 RX ADMIN — METFORMIN HYDROCHLORIDE 500 MG: 500 TABLET ORAL at 17:30

## 2022-10-27 RX ADMIN — OXYCODONE AND ACETAMINOPHEN 1 TABLET: 5; 325 TABLET ORAL at 12:42

## 2022-10-27 RX ADMIN — METHOCARBAMOL TABLETS 500 MG: 500 TABLET, COATED ORAL at 08:38

## 2022-10-27 RX ADMIN — CYANOCOBALAMIN TAB 500 MCG 250 MCG: 500 TAB at 08:38

## 2022-10-27 RX ADMIN — HALOPERIDOL LACTATE 5 MG: 5 INJECTION, SOLUTION INTRAMUSCULAR at 23:30

## 2022-10-27 RX ADMIN — METHOCARBAMOL TABLETS 500 MG: 500 TABLET, COATED ORAL at 12:42

## 2022-10-27 RX ADMIN — ASPIRIN 81 MG 81 MG: 81 TABLET ORAL at 08:39

## 2022-10-27 ASSESSMENT — PAIN SCALES - WONG BAKER
WONGBAKER_NUMERICALRESPONSE: 0
WONGBAKER_NUMERICALRESPONSE: 2
WONGBAKER_NUMERICALRESPONSE: 0

## 2022-10-27 NOTE — PLAN OF CARE
Problem: Skin/Tissue Integrity  Goal: Absence of new skin breakdown  Description: 1. Monitor for areas of redness and/or skin breakdown  2. Assess vascular access sites hourly  3. Every 4-6 hours minimum:  Change oxygen saturation probe site  4. Every 4-6 hours:  If on nasal continuous positive airway pressure, respiratory therapy assess nares and determine need for appliance change or resting period. Outcome: Progressing     Problem: Discharge Planning  Goal: Discharge to home or other facility with appropriate resources  Outcome: Progressing  Flowsheets (Taken 10/27/2022 0851)  Discharge to home or other facility with appropriate resources:   Identify barriers to discharge with patient and caregiver   Arrange for needed discharge resources and transportation as appropriate   Identify discharge learning needs (meds, wound care, etc)     Problem: Safety - Adult  Goal: Free from fall injury  Outcome: Progressing     Problem: ABCDS Injury Assessment  Goal: Absence of physical injury  Outcome: Progressing     Problem: Confusion  Goal: Confusion, delirium, dementia, or psychosis is improved or at baseline  Description: INTERVENTIONS:  1. Assess for possible contributors to thought disturbance, including medications, impaired vision or hearing, underlying metabolic abnormalities, dehydration, psychiatric diagnoses, and notify attending LIP  2. [place] high risk fall precautions, as indicated  3. Provide frequent short contacts to provide reality reorientation, refocusing and direction  4. Decrease environmental stimuli, including noise as appropriate  5. Monitor and intervene to maintain adequate nutrition, hydration, elimination, sleep and activity  6. If unable to ensure safety without constant attention obtain sitter and review sitter guidelines with assigned personnel  7.  Initiate Psychosocial CNS and Spiritual Care consult, as indicated  Outcome: Progressing  Flowsheets (Taken 10/27/2022 0851)  Effect of thought disturbance (confusion, delirium, dementia, or psychosis) are managed with adequate functional status: Assess for contributors to thought disturbance, including medications, impaired vision or hearing, underlying metabolic abnormalities, dehydration, psychiatric diagnoses, notify LIP     Problem: Chronic Conditions and Co-morbidities  Goal: Patient's chronic conditions and co-morbidity symptoms are monitored and maintained or improved  Outcome: Progressing  Flowsheets (Taken 10/27/2022 0295)  Care Plan - Patient's Chronic Conditions and Co-Morbidity Symptoms are Monitored and Maintained or Improved: Monitor and assess patient's chronic conditions and comorbid symptoms for stability, deterioration, or improvement     Problem: Nutrition Deficit:  Goal: Optimize nutritional status  Outcome: Progressing     Problem: Pain  Goal: Verbalizes/displays adequate comfort level or baseline comfort level  Outcome: Progressing

## 2022-10-27 NOTE — PLAN OF CARE
Problem: Skin/Tissue Integrity  Goal: Absence of new skin breakdown  Description: Monitor for areas of redness and/or skin breakdown  10/27/2022 0102 by Brie Feldman RN  Outcome: Progressing     Problem: Safety - Adult  Goal: Free from fall injury  10/27/2022 0102 by Brie Feldman RN  Outcome: Progressing     Problem: ABCDS Injury Assessment  Goal: Absence of physical injury  10/27/2022 0102 by Brie Feldman RN  Outcome: Progressing     Problem: Confusion  Goal: Confusion, delirium, dementia, or psychosis is improved or at baseline  10/27/2022 0102 by Brie Feldman RN  Outcome: Progressing  Flowsheets (Taken 10/27/2022 0102)  Effect of thought disturbance (confusion, delirium, dementia, or psychosis) are managed with adequate functional status:   [place] high risk fall precautions, as indicated   Decrease environmental stimuli, including noise as appropriate   Monitor and intervene to maintain adequate nutrition, hydration, elimination, sleep and activity

## 2022-10-27 NOTE — PROGRESS NOTES
Occupational Therapy  Attempted OT tx. Pt was resting in bed with eyes closed but did respond to few questions. He declined OT tx, \"Not right now. \" Pt also declined lunch tray. Cont OT.    Eliceo Sanchez OT

## 2022-10-27 NOTE — PROGRESS NOTES
Hospitalist Progress Note      PCP: Ty Edward MD    Date of Admission: 10/21/2022    Chief Complaint: confusion, aggression    Hospital Course:   78 y.o. male who presented to [facility] with above complaints  Patient with PMH of dementia, DM2, HTN, HLD, prostate CA was sent from [facility] for aggressive behavior. Patient has history of dementia and is mostly well behaved but since yesterday he has been very aggressive with staff. They gave him Haldol yesterday but he continued to have episodes of aggressive behavior today so they sent him to the ED. patient himself is very demented to endorse any complaints at this time however he does deny any chest pain or palpitations. In the ED patient was found to have new onset A. fib rate controlled on the EKG. Subjective: continues to be confused with intermittent aggression. Easily redirectable.        Medications:  Reviewed    Infusion Medications    sodium chloride       Scheduled Medications    QUEtiapine  25 mg Oral BID    aspirin  81 mg Oral Daily    psyllium  1 packet Oral Daily    QUEtiapine  50 mg Oral Nightly    vitamin B-12  250 mcg Oral Daily    methocarbamol  500 mg Oral 4x Daily    cetirizine  10 mg Oral Daily    atorvastatin  10 mg Oral Nightly    metFORMIN  500 mg Oral BID WC    sodium chloride flush  5-40 mL IntraVENous 2 times per day    enoxaparin  40 mg SubCUTAneous Daily     PRN Meds: oxyCODONE-acetaminophen, haloperidol lactate, sodium chloride flush, sodium chloride, ondansetron **OR** ondansetron, polyethylene glycol, acetaminophen **OR** acetaminophen, perflutren lipid microspheres      Intake/Output Summary (Last 24 hours) at 10/27/2022 1648  Last data filed at 10/27/2022 1532  Gross per 24 hour   Intake 130 ml   Output 0 ml   Net 130 ml       Physical Exam Performed:    /74   Pulse 74   Temp 97.4 °F (36.3 °C) (Axillary)   Resp 16   Ht 6' 2\" (1.88 m)   Wt 166 lb 7.2 oz (75.5 kg)   SpO2 99%   BMI 21.37 kg/m² General appearance:  No apparent distress, appears stated age and cooperative. HEENT:  Normal cephalic,  without obvious deformity. Pupils equal, round, and reactive to light. Extra ocular muscles intact. Conjunctivae/corneas clear. Ecchymosis around the left eye with bruising  Neck: Supple, with full range of motion. No jugular venous distention. Trachea midline. Respiratory:  Normal respiratory effort. Clear to auscultation, bilaterally without Rales/Wheezes/Rhonchi. Cardiovascular: Irregularly irregular rhythm with normal K7/H5 with loud systolic murmur along LSB, no rubs or gallops. Abdomen: Soft, non-tender, non-distended with normal bowel sounds. Musculoskeletal:  No clubbing, cyanosis or edema bilaterally. Full range of motion without deformity. Skin: Skin color, texture, turgor normal.  No rashes or lesions. Neurologic: Alert and oriented to self, does not know place or time. Neurovascularly intact without any focal sensory/motor deficits. Cranial nerves: II-XII intact, grossly non-focal.  Psychiatric:  Alert but disoriented  Capillary Refill: Brisk,3 seconds, normal  Peripheral Pulses: +2 palpable, equal bilaterally       Labs:   No results for input(s): WBC, HGB, HCT, PLT in the last 72 hours. No results for input(s): NA, K, CL, CO2, BUN, CREATININE, CALCIUM, PHOS in the last 72 hours. Invalid input(s): MAGNES    No results for input(s): AST, ALT, BILIDIR, BILITOT, ALKPHOS in the last 72 hours. No results for input(s): INR in the last 72 hours. No results for input(s): Valiant Medal in the last 72 hours.       Urinalysis:      Lab Results   Component Value Date/Time    NITRU Negative 10/21/2022 09:35 PM    WBCUA 6-9 10/21/2022 09:35 PM    BACTERIA 1+ 10/21/2022 09:35 PM    RBCUA 5-10 10/21/2022 09:35 PM    BLOODU TRACE-INTACT 10/21/2022 09:35 PM    SPECGRAV 1.015 10/21/2022 09:35 PM    GLUCOSEU Negative 10/21/2022 09:35 PM       Radiology:  CT HEAD WO CONTRAST   Final Result 1. 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 These are   favored to be old, but that is not certain. 2. No acute intracranial findings elsewhere. 3. Volume loss and chronic microvascular ischemic changes. Assessment/Plan:    Active Hospital Problems    Diagnosis     Dementia with aggressive behavior [Z82.089]      Priority: Medium    [facility] ([facility] [I48.91]      Priority: Medium    Hyperlipidemia [E78.5]     Prostate cancer ([facility] Nivia Danger     DM (diabetes mellitus), type 2 ([facility] [E11.9]      Persistent Afib  - rate controlled  - echo with EF 60-65%  - not on AC prior to admission    Dementia with behavioral disturbance  - chronic intermittent agitation  - increased seroquel. continue zoloft  - improvement in behaviors today  - possible enrollment in hospice following discharge    DMII  - well controlled  - continue metformin    HLD  - continue statin    DVT Prophylaxis: lovenox  Diet: ADULT DIET; Regular  ADULT ORAL NUTRITION SUPPLEMENT; Breakfast, Lunch, Dinner; Standard High Calorie/High Protein Oral Supplement  Code Status: Full Code  PT/OT Eval Status: ordered    Dispo - plan for discharge back to AL tomorrow. Will likely enroll in hospice following discharge.     Appropriate for A1 Discharge Unit: Eduarda De Santiago MD

## 2022-10-27 NOTE — CARE COORDINATION
Chart reviewed. Confusion with aggressive behavior. Sent from [facility] AL/dementia unit due to aggressive behaviors. Family refusing to send pt back to [facility] due to concerns over Enriquez of care\". They report that he was left in soiled briefs and that he was not being encouraged or assisted to eat. Family reports that pt has developed open area on his sacrum since being at [facility]. Due to pt's aggressive behaviors, CM has been having difficulty getting pt placed in another facility. Referrals have been made to the following facilities. [facility] -unable to accept d/t behaviors  [facility] -unable to accept skilled, could accept LTC. Would not be family's preference. [facility] -unable to accept d/t behaviors     10/25/22   Referral sent to [facility] with [facility]. [facility] is taking pt's wife to tour facilities tomorrow. Referral faxed to Lord Lopes at [facility] on 10/25/22  -suggesting that pt have psych consult, possibly minor-psych admission for medication adjustment, as pt has been requiring PRN Haldol this day. CM d/w Attending, unsure that is the best course for this pt at this time. 10/26/22   Received voice mail from [employer]. Plans to tour facilities w/pt's wife today. States that minor-psych would be an option for this pt, since he has a good discharge plan after potential medication adjustments. Per her d/w wife, pt has only been on Seroquel for 2 weeks, perhaps his dose needs to be adjusted. Suggesting either [facility] minor-psych units. CM will d/w Attending again today during MD jama. [facility] also requested that CM fax referral to [facility] attention Sharmaine Swenson. Referral faxed. Jamaica Patel RN    Addendum 13:46  Updated notes faxed to [facility] [phone] and [facility]. [phone]  Addendum 14:00  Referral information emailed to Lord Lopes at [facility]. [email or web address]    10/27/22  CM spoke with Angella with [employer] this morning.  [facility] would require 24 hour sitter to take pt. Pt's wife will tour more facilities today. Referral faxed to [facility] [phone], darius Orellana, per Angella's request. Wife now agreeable to possible LTC at [facility]. CM called [facility] [phone] for referral. Pt will be private pay. Medicaid application has not been started. CM continues to follow. Jamaica Dean RN     Addendum 13:10  KEMI spoke with pt's wife, Doroteo Alexander, plan now to return to [facility] care unit. CM called [facility], spoke with Maryanne Longo. She states that pt needs to be reviewed prior to approval to return. All requested documents faxed to [phone]. CM awaiting response. Hopeful that pt will be able to discharge back to [facility]. Attempting to arrange transport for noon on 10/28/22 Kavya Mendez RN     Addendum 14:10  Transport set up for 11:30 AM with [facility]  Addendum 14:51  [facility] Transport pushed back to 2PM on 10/28/22. Addendum 15:40  KEMI spoke with Maryanne Longo at [facility]. Pt is accepted and able to return to the memory care unit tomorrow. CM updated wife, Doroteo Alexander.  Jamaica Dean RN

## 2022-10-28 VITALS
TEMPERATURE: 97.6 F | HEART RATE: 97 BPM | RESPIRATION RATE: 18 BRPM | DIASTOLIC BLOOD PRESSURE: 67 MMHG | OXYGEN SATURATION: 99 % | WEIGHT: 166.45 LBS | HEIGHT: 74 IN | BODY MASS INDEX: 21.36 KG/M2 | SYSTOLIC BLOOD PRESSURE: 137 MMHG

## 2022-10-28 PROCEDURE — 6360000002 HC RX W HCPCS: Performed by: INTERNAL MEDICINE

## 2022-10-28 PROCEDURE — 6370000000 HC RX 637 (ALT 250 FOR IP): Performed by: INTERNAL MEDICINE

## 2022-10-28 PROCEDURE — 2580000003 HC RX 258: Performed by: INTERNAL MEDICINE

## 2022-10-28 PROCEDURE — 6370000000 HC RX 637 (ALT 250 FOR IP): Performed by: PSYCHIATRY & NEUROLOGY

## 2022-10-28 RX ORDER — QUETIAPINE FUMARATE 25 MG/1
25 TABLET, FILM COATED ORAL 2 TIMES DAILY
Qty: 60 TABLET | Refills: 0 | Status: SHIPPED | OUTPATIENT
Start: 2022-10-28

## 2022-10-28 RX ORDER — OXYCODONE HYDROCHLORIDE AND ACETAMINOPHEN 5; 325 MG/1; MG/1
1 TABLET ORAL EVERY 6 HOURS PRN
Qty: 12 TABLET | Refills: 0 | Status: SHIPPED | OUTPATIENT
Start: 2022-10-28 | End: 2022-10-31

## 2022-10-28 RX ADMIN — PSYLLIUM HUSK 1 PACKET: 3.4 POWDER ORAL at 08:06

## 2022-10-28 RX ADMIN — CETIRIZINE HYDROCHLORIDE 10 MG: 10 TABLET, FILM COATED ORAL at 08:06

## 2022-10-28 RX ADMIN — METHOCARBAMOL TABLETS 500 MG: 500 TABLET, COATED ORAL at 08:07

## 2022-10-28 RX ADMIN — OXYCODONE AND ACETAMINOPHEN 1 TABLET: 5; 325 TABLET ORAL at 08:17

## 2022-10-28 RX ADMIN — ASPIRIN 81 MG 81 MG: 81 TABLET ORAL at 08:08

## 2022-10-28 RX ADMIN — METFORMIN HYDROCHLORIDE 500 MG: 500 TABLET ORAL at 08:07

## 2022-10-28 RX ADMIN — ENOXAPARIN SODIUM 40 MG: 100 INJECTION SUBCUTANEOUS at 08:06

## 2022-10-28 RX ADMIN — SODIUM CHLORIDE, PRESERVATIVE FREE 10 ML: 5 INJECTION INTRAVENOUS at 08:08

## 2022-10-28 RX ADMIN — QUETIAPINE FUMARATE 25 MG: 25 TABLET ORAL at 08:07

## 2022-10-28 RX ADMIN — HALOPERIDOL LACTATE 5 MG: 5 INJECTION, SOLUTION INTRAMUSCULAR at 08:07

## 2022-10-28 RX ADMIN — CYANOCOBALAMIN TAB 500 MCG 250 MCG: 500 TAB at 08:07

## 2022-10-28 ASSESSMENT — PAIN SCALES - WONG BAKER
WONGBAKER_NUMERICALRESPONSE: 0
WONGBAKER_NUMERICALRESPONSE: 0

## 2022-10-28 NOTE — PLAN OF CARE
Problem: Confusion  Goal: Confusion, delirium, dementia, or psychosis is improved or at baseline  10/27/2022 2214 by Júnior Donald RN  Outcome: Not Progressing     Problem: Nutrition Deficit:  Goal: Optimize nutritional status  10/27/2022 2214 by Júnior Donald RN  Outcome: Not Progressing    Problem: Skin/Tissue Integrity  Goal: Absence of new skin breakdown  10/27/2022 2214 by Júnior Donald RN  Outcome: Progressing    Problem: Safety - Adult  Goal: Free from fall injury  10/27/2022 2214 by Júinor Donald RN  Outcome: Progressing

## 2022-10-28 NOTE — PROGRESS NOTES
Report attempted for pt @the [address] [place] [address], unable to leave voice message. Report given to nurse [facility]. Pt discharged. Pt transported with EMS via stretcher. IV and tele discontinued, CMU made aware. Pt personal belongings sent with wife. Pt sent with AVS and prescription medications.

## 2022-10-28 NOTE — DISCHARGE INSTR - DIET

## 2022-10-28 NOTE — DISCHARGE INSTR - COC
Continuity of Care Form    Patient Name: Caryn Selby   :  1940  MRN:  [id number]    Admit date:  10/21/2022  Discharge date:  10/28/2022    Code Status Order: Full Code   Advance Directives:     Admitting Physician:  Eun Phillips MD  PCP: Damaris Garcia MD    Discharging Nurse: Jhoan Browne RN  [address] Unit/Room#: 2823/2042-18  Discharging Unit Phone Number: [phone]    Emergency Contact:   Extended Emergency Contact Information  Primary Emergency Contact: Timoteo Heard  Address: Enrike Haney [address] [place], [place] [address]ra[address] D[address] Phone: [phone]  Mobile Phone: [phone]  Relation: Spouse    Past Surgical History:  Past Surgical History:   Procedure Laterality Date    CATARACT REMOVAL      EYE SURGERY      cataract    JOINT REPLACEMENT Right 13    right total knee replacement    PROSTATECTOMY         Immunization History:   Immunization History   Administered Date(s) Administered    COVID-19, PFIZER PURPLE top, DILUTE for use, (age 15 y+), 30mcg/0.3mL 2021, 2021, 10/20/2021       Active Problems:  Patient Active Problem List   Diagnosis Code    Arthritis of knee M17.10    DM (diabetes mellitus), type 2 ([address] E11.9    Hyperlipidemia E78.5    Prostate cancer ([facility] C61    S/P TKR (total knee replacement) Z96.659    Peripheral neuropathy G62.9    Spinal stenosis M48.00    New onset a-fib ([facility] I48.91    Dementia with aggressive behavior F03.918       Isolation/Infection:   Isolation            No Isolation          Patient Infection Status       None to display            Nurse Assessment:  Last Vital Signs: /67   Pulse 97   Temp 97.6 °F (36.4 °C) (Axillary)   Resp 18   Ht 6' 2\" (1.88 m)   Wt 166 lb 7.2 oz (75.5 kg)   SpO2 99%   BMI 21.37 kg/m²     Last documented pain score (0-10 scale): Pain Level: 8  Last Weight:   Wt Readings from Last 1 Encounters:   10/22/22 166 lb 7.2 oz (75.5 kg) Mental Status:  disoriented and alert    IV Access:  - None    Nursing Mobility/ADLs:  Walking   Dependent  Transfer  Dependent  Bathing  Dependent  Dressing  Dependent  Toileting  Dependent  Feeding  Assisted  Med Admin  Dependent  Med Delivery   crushed    Wound Care Documentation and Therapy:  Incision 06/06/13 Knee Anterior;Right (Active)   Number of days: 3711        Elimination:  Continence: Bowel: No  Bladder: No  Urinary Catheter: None   Colostomy/Ileostomy/Ileal Conduit: No       Date of Last BM:   10/25/2022      Intake/Output Summary (Last 24 hours) at 10/28/2022 1141  Last data filed at 10/28/2022 1021  Gross per 24 hour   Intake 280 ml   Output 600 ml   Net -320 ml     I/O last 3 completed shifts: In: 390 [P.O.:380; I.V.:10]  Out: 575 [Urine:575]    Safety Concerns: At Risk for Falls    Impairments/Disabilities:      None    Nutrition Therapy:  Current Nutrition Therapy:   - Oral Diet:  General    Routes of Feeding: Oral  Liquids: Thin Liquids  Daily Fluid Restriction: no  Last Modified Barium Swallow with Video (Video Swallowing Test): not done    Treatments at the Time of Hospital Discharge:   Respiratory Treatments: N/A  Oxygen Therapy:  is not on home oxygen therapy.   Ventilator:    - No ventilator support    Rehab Therapies: N/A  Weight Bearing Status/Restrictions: No weight bearing restrictions  Other Medical Equipment (for information only, NOT a DME order):  hospital bed  Other Treatments: N/A    Patient's personal belongings (please select all that are sent with patient):  None    RN SIGNATURE:  Electronically signed by Dorrine Blizzard, RN on 10/28/22 at 12:00 PM EDT    CASE MANAGEMENT/SOCIAL WORK SECTION    Inpatient Status Date: 10/21/22    Readmission Risk Assessment Score:  Readmission Risk              Risk of Unplanned Readmission:  15           Discharging to Facility/ Agency   Name: [facility]  Address:  Phone:[phone]  Fax:      /Social

## 2022-10-28 NOTE — DISCHARGE SUMMARY
Hospital Medicine Discharge Summary    Patient ID: Irene Parker      Patient's PCP: Vivek Lema MD    Admit Date: 10/21/2022     Discharge Date: 10/28/2022      Admitting Provider: Joanie Suarez MD     Discharge Provider: Khai Martinez MD     Discharge Diagnoses: Active Hospital Problems    Diagnosis     Dementia with aggressive behavior [A74.634]      Priority: Medium    New onset a-fib ([facility] [I48.91]      Priority: Medium    Hyperlipidemia [E78.5]     Prostate cancer ([facility] Orelia Keto     DM (diabetes mellitus), type 2 ([facility] [E11.9]        The patient was seen and examined on day of discharge and this discharge summary is in conjunction with any daily progress note from day of discharge. Hospital Course:   78 y.o. male who presented to [facility] with above complaints  Patient with PMH of dementia, DM2, HTN, HLD, prostate CA was sent from [facility] for aggressive behavior. Patient has history of dementia and is mostly well behaved but since yesterday he has been very aggressive with staff. They gave him Haldol yesterday but he continued to have episodes of aggressive behavior today so they sent him to the ED. patient himself is very demented to endorse any complaints at this time however he does deny any chest pain or palpitations. In the ED patient was found to have new onset A. fib rate controlled on the EKG. Persistent Afib  - rate controlled  - echo with EF 60-65%  - not on AC prior to admission     Dementia with behavioral disturbance  - chronic intermittent agitation  - increased seroquel.  continue zoloft  - improvement in behaviors since admission  - possible enrollment in hospice following discharge     DMII  - well controlled  - continue metformin     HLD  - continue statin    Physical Exam Performed:     /67   Pulse 97   Temp 97.6 °F (36.4 °C) (Axillary)   Resp 18   Ht 6' 2\" (1.88 m)   Wt 166 lb 7.2 oz (75.5 kg)   SpO2 99%   BMI 21.37 kg/m² General appearance:  No apparent distress, appears stated age and cooperative. HEENT:  Normal cephalic,  without obvious deformity. Pupils equal, round, and reactive to light. Extra ocular muscles intact. Conjunctivae/corneas clear. Ecchymosis around the left eye with bruising  Neck: Supple, with full range of motion. No jugular venous distention. Trachea midline. Respiratory:  Normal respiratory effort. Clear to auscultation, bilaterally without Rales/Wheezes/Rhonchi. Cardiovascular: Irregularly irregular rhythm with normal L0/R0 with loud systolic murmur along LSB, no rubs or gallops. Abdomen: Soft, non-tender, non-distended with normal bowel sounds. Musculoskeletal:  No clubbing, cyanosis or edema bilaterally. Full range of motion without deformity. Skin: Skin color, texture, turgor normal.  No rashes or lesions. Neurologic: Alert and oriented to self, does not know place or time. Neurovascularly intact without any focal sensory/motor deficits. Cranial nerves: II-XII intact, grossly non-focal.  Psychiatric:  Alert but disoriented  Capillary Refill: Brisk,3 seconds, normal  Peripheral Pulses: +2 palpable, equal bilaterally       Labs: For convenience and continuity at follow-up the following most recent labs are provided:      CBC:    Lab Results   Component Value Date/Time    WBC 8.9 10/21/2022 07:51 PM    HGB 12.7 10/21/2022 07:51 PM    HCT 37.6 10/21/2022 07:51 PM     10/21/2022 07:51 PM       Renal:    Lab Results   Component Value Date/Time     10/21/2022 11:03 PM    K 4.9 10/21/2022 11:03 PM     10/21/2022 11:03 PM    CO2 27 10/21/2022 11:03 PM    BUN 29 10/21/2022 11:03 PM    CREATININE 0.8 10/21/2022 11:03 PM    CALCIUM 9.2 10/21/2022 11:03 PM         Significant Diagnostic Studies    Radiology:   CT HEAD WO CONTRAST   Final Result   1. 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 These are   favored to be old, but that is not certain.    2. No acute intracranial findings elsewhere. 3. Volume loss and chronic microvascular ischemic changes. Consults:     IP CONSULT TO CARDIOLOGY  IP CONSULT TO PSYCHIATRY    Disposition:  AL     Condition at Discharge: Stable    Discharge Instructions/Follow-up:  Follow up with PCP within 1-2 weeks    Code Status:  DNR    Activity: activity as tolerated    Diet: regular diet      Discharge Medications:     Discharge Medication List as of 10/28/2022  1:34 PM             Details   oxyCODONE-acetaminophen (PERCOCET) 5-325 MG per tablet Take 1 tablet by mouth every 6 hours as needed for Pain for up to 3 days. , Disp-12 tablet, R-0Normal      QUEtiapine (SEROQUEL) 25 MG tablet Take 1 tablet by mouth in the morning and 1 tablet in the evening., Disp-60 tablet, R-0Normal                Details   vitamin B-12 (CYANOCOBALAMIN) 100 MCG tablet Take 50 mcg by mouth daily Every other dayHistorical Med      psyllium (KONSYL) 28.3 % PACK Take 1 packet by mouth daily Every 3 daysHistorical Med      sertraline (ZOLOFT) 50 MG tablet Take 50 mg by mouth dailyHistorical Med      QUEtiapine (SEROQUEL XR) 50 MG extended release tablet Take 50 mg by mouth nightlyHistorical Med      metformin (GLUCOPHAGE) 500 MG tablet Take 500 mg by mouth 2 times daily (with meals). Time Spent on discharge is more than 30 minutes in the examination, evaluation, counseling and review of medications and discharge plan. Signed:    Damián Arnold MD   10/28/2022      Thank you Anila Vazquez MD for the opportunity to be involved in this patient's care. If you have any questions or concerns, please feel free to contact me at [phone].

## 2022-10-28 NOTE — CARE COORDINATION
CASE MANAGEMENT DISCHARGE SUMMARY      Discharge to: The [address] completed: [facility] Exemption Notification (HENS) completed: NA    IMM given: (date) 10/28/22    New Durable Medical Equipment ordered/agency:     Transportation:       Medical Transport explained to pt/family. Pt/family voice no agency preference. Agency used: [facility]   time: 2PM   Ambulance form completed: Yes    Confirmed discharge plan with:     Patient: yes     Family:  yes, wife/Cassia     Facility/Agency, name:  [facility] faxed   Phone number for report to facility: [phone]     RN, name: Noreen Chapman    Note: Discharging nurse to complete [facility], reconcile [facility], and place final copy with patient's discharge packet. RN to ensure that written prescriptions for  Level II medications are sent with patient to the facility as per protocol.